# Patient Record
Sex: FEMALE | Race: WHITE | Employment: OTHER | URBAN - METROPOLITAN AREA
[De-identification: names, ages, dates, MRNs, and addresses within clinical notes are randomized per-mention and may not be internally consistent; named-entity substitution may affect disease eponyms.]

---

## 2017-01-13 RX ORDER — BUTALBITAL, ACETAMINOPHEN AND CAFFEINE 50; 325; 40 MG/1; MG/1; MG/1
TABLET ORAL
Qty: 120 TABLET | Refills: 0 | Status: SHIPPED | OUTPATIENT
Start: 2017-01-13 | End: 2017-02-10 | Stop reason: SDUPTHER

## 2017-01-17 RX ORDER — VALACYCLOVIR HYDROCHLORIDE 500 MG/1
500 TABLET, FILM COATED ORAL 2 TIMES DAILY
COMMUNITY
End: 2017-01-20 | Stop reason: SDUPTHER

## 2017-01-17 RX ORDER — CALCIUM CARBONATE 300MG(750)
TABLET,CHEWABLE ORAL
COMMUNITY
End: 2017-01-20 | Stop reason: ALTCHOICE

## 2017-01-17 RX ORDER — ATENOLOL 25 MG/1
25 TABLET ORAL DAILY
COMMUNITY
End: 2017-04-11 | Stop reason: SDUPTHER

## 2017-01-20 ENCOUNTER — OFFICE VISIT (OUTPATIENT)
Dept: FAMILY MEDICINE CLINIC | Facility: CLINIC | Age: 82
End: 2017-01-20

## 2017-01-20 VITALS
BODY MASS INDEX: 21.97 KG/M2 | DIASTOLIC BLOOD PRESSURE: 70 MMHG | HEIGHT: 59 IN | WEIGHT: 109 LBS | SYSTOLIC BLOOD PRESSURE: 122 MMHG | HEART RATE: 72 BPM

## 2017-01-20 DIAGNOSIS — D64.89 ANEMIA DUE TO OTHER CAUSE, NOT CLASSIFIED: ICD-10-CM

## 2017-01-20 DIAGNOSIS — R30.0 DYSURIA: ICD-10-CM

## 2017-01-20 DIAGNOSIS — G35 MULTIPLE SCLEROSIS (HCC): ICD-10-CM

## 2017-01-20 DIAGNOSIS — R53.82 CHRONIC FATIGUE: ICD-10-CM

## 2017-01-20 DIAGNOSIS — G43.709 CHRONIC MIGRAINE WITHOUT AURA WITHOUT STATUS MIGRAINOSUS, NOT INTRACTABLE: Primary | ICD-10-CM

## 2017-01-20 DIAGNOSIS — I10 ESSENTIAL HYPERTENSION: ICD-10-CM

## 2017-01-20 DIAGNOSIS — E78.2 MIXED HYPERLIPIDEMIA: ICD-10-CM

## 2017-01-20 DIAGNOSIS — F51.01 PRIMARY INSOMNIA: ICD-10-CM

## 2017-01-20 PROBLEM — D64.9 ABSOLUTE ANEMIA: Status: ACTIVE | Noted: 2017-01-20

## 2017-01-20 LAB
BILIRUBIN, POC: NORMAL
BLOOD URINE, POC: NORMAL
CLARITY, POC: CLEAR
COLOR, POC: YELLOW
GLUCOSE URINE, POC: NORMAL
KETONES, POC: NORMAL
LEUKOCYTE EST, POC: NORMAL
NITRITE, POC: NORMAL
PH, POC: 5.5
PROTEIN, POC: NORMAL
SPECIFIC GRAVITY, POC: 1.02
UROBILINOGEN, POC: 0.2

## 2017-01-20 PROCEDURE — 1123F ACP DISCUSS/DSCN MKR DOCD: CPT | Performed by: FAMILY MEDICINE

## 2017-01-20 PROCEDURE — 81003 URINALYSIS AUTO W/O SCOPE: CPT | Performed by: FAMILY MEDICINE

## 2017-01-20 PROCEDURE — G8484 FLU IMMUNIZE NO ADMIN: HCPCS | Performed by: FAMILY MEDICINE

## 2017-01-20 PROCEDURE — 1036F TOBACCO NON-USER: CPT | Performed by: FAMILY MEDICINE

## 2017-01-20 PROCEDURE — G8419 CALC BMI OUT NRM PARAM NOF/U: HCPCS | Performed by: FAMILY MEDICINE

## 2017-01-20 PROCEDURE — 99214 OFFICE O/P EST MOD 30 MIN: CPT | Performed by: FAMILY MEDICINE

## 2017-01-20 PROCEDURE — 4040F PNEUMOC VAC/ADMIN/RCVD: CPT | Performed by: FAMILY MEDICINE

## 2017-01-20 PROCEDURE — 1090F PRES/ABSN URINE INCON ASSESS: CPT | Performed by: FAMILY MEDICINE

## 2017-01-20 PROCEDURE — G8427 DOCREV CUR MEDS BY ELIG CLIN: HCPCS | Performed by: FAMILY MEDICINE

## 2017-01-20 PROCEDURE — G8400 PT W/DXA NO RESULTS DOC: HCPCS | Performed by: FAMILY MEDICINE

## 2017-01-20 RX ORDER — VALACYCLOVIR HYDROCHLORIDE 500 MG/1
500 TABLET, FILM COATED ORAL 2 TIMES DAILY
Qty: 30 TABLET | Refills: 1 | Status: SHIPPED | OUTPATIENT
Start: 2017-01-20 | End: 2019-01-04 | Stop reason: SDUPTHER

## 2017-01-20 RX ORDER — CLINDAMYCIN HYDROCHLORIDE 300 MG/1
CAPSULE ORAL
COMMUNITY
Start: 2017-01-19

## 2017-01-20 ASSESSMENT — ENCOUNTER SYMPTOMS
ABDOMINAL PAIN: 0
VISUAL CHANGE: 1
BLURRED VISION: 1
VOMITING: 0
COUGH: 0
SHORTNESS OF BREATH: 0
NAUSEA: 0
BLOOD IN STOOL: 0

## 2017-02-10 RX ORDER — BUTALBITAL, ACETAMINOPHEN AND CAFFEINE 50; 325; 40 MG/1; MG/1; MG/1
TABLET ORAL
Qty: 120 TABLET | Refills: 0 | Status: SHIPPED | OUTPATIENT
Start: 2017-02-10 | End: 2017-03-10 | Stop reason: SDUPTHER

## 2017-02-27 LAB
CHOLESTEROL, TOTAL: 191 MG/DL
CHOLESTEROL/HDL RATIO: 2.7
HDLC SERPL-MCNC: 70 MG/DL (ref 35–70)
LDL CHOLESTEROL CALCULATED: 90 MG/DL (ref 0–160)
TRIGL SERPL-MCNC: 157 MG/DL
TSH SERPL DL<=0.05 MIU/L-ACNC: NORMAL UIU/ML
VITAMIN B-12: NORMAL
VLDLC SERPL CALC-MCNC: 31 MG/DL

## 2017-02-28 DIAGNOSIS — D64.89 ANEMIA DUE TO OTHER CAUSE, NOT CLASSIFIED: ICD-10-CM

## 2017-02-28 DIAGNOSIS — R53.82 CHRONIC FATIGUE: ICD-10-CM

## 2017-02-28 DIAGNOSIS — E78.2 MIXED HYPERLIPIDEMIA: ICD-10-CM

## 2017-03-10 RX ORDER — BUTALBITAL, ACETAMINOPHEN AND CAFFEINE 50; 325; 40 MG/1; MG/1; MG/1
TABLET ORAL
Qty: 120 TABLET | Refills: 0 | Status: SHIPPED | OUTPATIENT
Start: 2017-03-10 | End: 2017-04-10 | Stop reason: SDUPTHER

## 2017-04-10 RX ORDER — BUTALBITAL, ACETAMINOPHEN AND CAFFEINE 50; 325; 40 MG/1; MG/1; MG/1
TABLET ORAL
Qty: 120 TABLET | Refills: 0 | Status: SHIPPED | OUTPATIENT
Start: 2017-04-10 | End: 2017-05-09 | Stop reason: SDUPTHER

## 2017-04-11 ENCOUNTER — OFFICE VISIT (OUTPATIENT)
Dept: FAMILY MEDICINE CLINIC | Age: 82
End: 2017-04-11
Payer: MEDICARE

## 2017-04-11 VITALS
BODY MASS INDEX: 22.46 KG/M2 | WEIGHT: 107 LBS | SYSTOLIC BLOOD PRESSURE: 116 MMHG | DIASTOLIC BLOOD PRESSURE: 72 MMHG | HEART RATE: 80 BPM | HEIGHT: 58 IN

## 2017-04-11 DIAGNOSIS — D50.8 OTHER IRON DEFICIENCY ANEMIA: ICD-10-CM

## 2017-04-11 DIAGNOSIS — M15.9 PRIMARY OSTEOARTHRITIS INVOLVING MULTIPLE JOINTS: ICD-10-CM

## 2017-04-11 DIAGNOSIS — M81.0 OSTEOPOROSIS: ICD-10-CM

## 2017-04-11 DIAGNOSIS — K57.32 DIVERTICULITIS OF LARGE INTESTINE WITHOUT PERFORATION OR ABSCESS WITHOUT BLEEDING: ICD-10-CM

## 2017-04-11 DIAGNOSIS — Z01.818 PRE-OPERATIVE CLEARANCE: Primary | ICD-10-CM

## 2017-04-11 DIAGNOSIS — F34.1 DYSTHYMIA: ICD-10-CM

## 2017-04-11 DIAGNOSIS — E78.2 MIXED HYPERLIPIDEMIA: ICD-10-CM

## 2017-04-11 DIAGNOSIS — F41.9 ANXIETY: ICD-10-CM

## 2017-04-11 DIAGNOSIS — I10 ESSENTIAL HYPERTENSION: ICD-10-CM

## 2017-04-11 PROCEDURE — 1036F TOBACCO NON-USER: CPT | Performed by: FAMILY MEDICINE

## 2017-04-11 PROCEDURE — 4040F PNEUMOC VAC/ADMIN/RCVD: CPT | Performed by: FAMILY MEDICINE

## 2017-04-11 PROCEDURE — 1123F ACP DISCUSS/DSCN MKR DOCD: CPT | Performed by: FAMILY MEDICINE

## 2017-04-11 PROCEDURE — G8419 CALC BMI OUT NRM PARAM NOF/U: HCPCS | Performed by: FAMILY MEDICINE

## 2017-04-11 PROCEDURE — 4005F PHARM THX FOR OP RXD: CPT | Performed by: FAMILY MEDICINE

## 2017-04-11 PROCEDURE — G8400 PT W/DXA NO RESULTS DOC: HCPCS | Performed by: FAMILY MEDICINE

## 2017-04-11 PROCEDURE — 1090F PRES/ABSN URINE INCON ASSESS: CPT | Performed by: FAMILY MEDICINE

## 2017-04-11 PROCEDURE — 99214 OFFICE O/P EST MOD 30 MIN: CPT | Performed by: FAMILY MEDICINE

## 2017-04-11 PROCEDURE — G8427 DOCREV CUR MEDS BY ELIG CLIN: HCPCS | Performed by: FAMILY MEDICINE

## 2017-04-11 RX ORDER — SIMVASTATIN 10 MG
10 TABLET ORAL NIGHTLY
Qty: 30 TABLET | Refills: 5 | Status: SHIPPED | OUTPATIENT
Start: 2017-04-11 | End: 2019-01-04 | Stop reason: SDUPTHER

## 2017-04-11 RX ORDER — ATENOLOL 25 MG/1
25 TABLET ORAL DAILY
Qty: 30 TABLET | Refills: 5 | Status: SHIPPED | OUTPATIENT
Start: 2017-04-11 | End: 2019-01-04 | Stop reason: SDUPTHER

## 2017-04-11 RX ORDER — PANTOPRAZOLE SODIUM 40 MG/1
40 TABLET, DELAYED RELEASE ORAL
COMMUNITY
Start: 2017-03-16 | End: 2019-01-04 | Stop reason: CLARIF

## 2017-04-11 ASSESSMENT — ENCOUNTER SYMPTOMS
VOMITING: 0
DIARRHEA: 0
NAUSEA: 0
COUGH: 0
HEARTBURN: 1
ABDOMINAL PAIN: 1
SHORTNESS OF BREATH: 0
BLURRED VISION: 1
BLOOD IN STOOL: 0
CONSTIPATION: 0

## 2017-05-09 RX ORDER — BUTALBITAL, ACETAMINOPHEN AND CAFFEINE 50; 325; 40 MG/1; MG/1; MG/1
TABLET ORAL
Qty: 120 TABLET | Refills: 0 | Status: SHIPPED | OUTPATIENT
Start: 2017-05-09 | End: 2017-06-06 | Stop reason: SDUPTHER

## 2017-05-22 DIAGNOSIS — D64.89 ANEMIA DUE TO OTHER CAUSE, NOT CLASSIFIED: ICD-10-CM

## 2017-05-22 DIAGNOSIS — R53.82 CHRONIC FATIGUE: ICD-10-CM

## 2017-06-06 RX ORDER — BUTALBITAL, ACETAMINOPHEN AND CAFFEINE 50; 325; 40 MG/1; MG/1; MG/1
TABLET ORAL
Qty: 120 TABLET | Refills: 0 | Status: SHIPPED | OUTPATIENT
Start: 2017-06-06 | End: 2017-07-01 | Stop reason: SDUPTHER

## 2017-07-03 RX ORDER — BUTALBITAL, ACETAMINOPHEN AND CAFFEINE 50; 325; 40 MG/1; MG/1; MG/1
TABLET ORAL
Qty: 120 TABLET | Refills: 1 | Status: SHIPPED | OUTPATIENT
Start: 2017-07-03 | End: 2017-08-31 | Stop reason: SDUPTHER

## 2017-07-14 ENCOUNTER — TELEPHONE (OUTPATIENT)
Dept: FAMILY MEDICINE CLINIC | Age: 82
End: 2017-07-14

## 2017-07-14 DIAGNOSIS — R53.82 CHRONIC FATIGUE: ICD-10-CM

## 2017-07-14 DIAGNOSIS — G43.709 CHRONIC MIGRAINE WITHOUT AURA WITHOUT STATUS MIGRAINOSUS, NOT INTRACTABLE: ICD-10-CM

## 2017-07-14 DIAGNOSIS — M15.9 PRIMARY OSTEOARTHRITIS INVOLVING MULTIPLE JOINTS: ICD-10-CM

## 2017-07-14 DIAGNOSIS — G35 MULTIPLE SCLEROSIS (HCC): Primary | ICD-10-CM

## 2017-07-21 ENCOUNTER — HOSPITAL ENCOUNTER (OUTPATIENT)
Facility: CLINIC | Age: 82
Discharge: HOME | End: 2017-07-21
Payer: MEDICARE

## 2017-07-21 ENCOUNTER — OFFICE VISIT (OUTPATIENT)
Dept: FAMILY MEDICINE CLINIC | Age: 82
End: 2017-07-21
Payer: MEDICARE

## 2017-07-21 VITALS
WEIGHT: 106 LBS | SYSTOLIC BLOOD PRESSURE: 158 MMHG | HEART RATE: 80 BPM | BODY MASS INDEX: 22.15 KG/M2 | DIASTOLIC BLOOD PRESSURE: 92 MMHG

## 2017-07-21 DIAGNOSIS — I10 ESSENTIAL HYPERTENSION: Primary | ICD-10-CM

## 2017-07-21 DIAGNOSIS — G47.09 OTHER INSOMNIA: ICD-10-CM

## 2017-07-21 DIAGNOSIS — D50.8 IRON DEFICIENCY ANEMIA SECONDARY TO INADEQUATE DIETARY IRON INTAKE: ICD-10-CM

## 2017-07-21 DIAGNOSIS — F32.89 OTHER DEPRESSION: ICD-10-CM

## 2017-07-21 LAB
ABSOLUTE EOS #: 0.1 K/UL (ref 0–0.4)
ABSOLUTE LYMPH #: 2.3 K/UL (ref 1–4.8)
ABSOLUTE MONO #: 0.4 K/UL (ref 0.1–1.2)
BASOPHILS # BLD: 1 %
BASOPHILS ABSOLUTE: 0 K/UL (ref 0–0.2)
DIFFERENTIAL TYPE: NORMAL
EOSINOPHILS RELATIVE PERCENT: 1 %
FERRITIN: 201 UG/L (ref 13–150)
HCT VFR BLD CALC: 39 % (ref 36–46)
HEMOGLOBIN: 13.1 G/DL (ref 12–16)
IRON: 119 UG/DL (ref 37–145)
LYMPHOCYTES # BLD: 36 %
MCH RBC QN AUTO: 30.5 PG (ref 26–34)
MCHC RBC AUTO-ENTMCNC: 33.6 G/DL (ref 31–37)
MCV RBC AUTO: 90.8 FL (ref 80–100)
MONOCYTES # BLD: 6 %
PDW BLD-RTO: 14.3 % (ref 12.5–15.4)
PLATELET # BLD: 289 K/UL (ref 140–450)
PLATELET ESTIMATE: NORMAL
PMV BLD AUTO: 7.5 FL (ref 6–12)
RBC # BLD: 4.3 M/UL (ref 4–5.2)
RBC # BLD: NORMAL 10*6/UL
SEG NEUTROPHILS: 56 %
SEGMENTED NEUTROPHILS ABSOLUTE COUNT: 3.5 K/UL (ref 1.8–7.7)
WBC # BLD: 6.2 K/UL (ref 3.5–11)
WBC # BLD: NORMAL 10*3/UL

## 2017-07-21 PROCEDURE — G8400 PT W/DXA NO RESULTS DOC: HCPCS | Performed by: FAMILY MEDICINE

## 2017-07-21 PROCEDURE — 85025 COMPLETE CBC W/AUTO DIFF WBC: CPT

## 2017-07-21 PROCEDURE — 4040F PNEUMOC VAC/ADMIN/RCVD: CPT | Performed by: FAMILY MEDICINE

## 2017-07-21 PROCEDURE — G8427 DOCREV CUR MEDS BY ELIG CLIN: HCPCS | Performed by: FAMILY MEDICINE

## 2017-07-21 PROCEDURE — 1123F ACP DISCUSS/DSCN MKR DOCD: CPT | Performed by: FAMILY MEDICINE

## 2017-07-21 PROCEDURE — 83540 ASSAY OF IRON: CPT

## 2017-07-21 PROCEDURE — 1090F PRES/ABSN URINE INCON ASSESS: CPT | Performed by: FAMILY MEDICINE

## 2017-07-21 PROCEDURE — G8420 CALC BMI NORM PARAMETERS: HCPCS | Performed by: FAMILY MEDICINE

## 2017-07-21 PROCEDURE — 36415 COLL VENOUS BLD VENIPUNCTURE: CPT

## 2017-07-21 PROCEDURE — 99214 OFFICE O/P EST MOD 30 MIN: CPT | Performed by: FAMILY MEDICINE

## 2017-07-21 PROCEDURE — 82728 ASSAY OF FERRITIN: CPT

## 2017-07-21 PROCEDURE — 1036F TOBACCO NON-USER: CPT | Performed by: FAMILY MEDICINE

## 2017-07-21 RX ORDER — DOXEPIN HYDROCHLORIDE 25 MG/1
75 CAPSULE ORAL NIGHTLY
Qty: 90 CAPSULE | Refills: 5 | Status: SHIPPED | OUTPATIENT
Start: 2017-07-21 | End: 2018-02-17 | Stop reason: SDUPTHER

## 2017-07-21 ASSESSMENT — ENCOUNTER SYMPTOMS
DOUBLE VISION: 0
BLOOD IN STOOL: 0
BLURRED VISION: 1
WHEEZING: 0
BACK PAIN: 0
ABDOMINAL PAIN: 0
SHORTNESS OF BREATH: 0
EYE PAIN: 0
HEARTBURN: 0
COUGH: 0

## 2017-08-31 RX ORDER — BUTALBITAL, ACETAMINOPHEN AND CAFFEINE 50; 325; 40 MG/1; MG/1; MG/1
TABLET ORAL
Qty: 120 TABLET | Refills: 0 | Status: SHIPPED | OUTPATIENT
Start: 2017-08-31 | End: 2017-09-28 | Stop reason: SDUPTHER

## 2017-09-21 ENCOUNTER — OFFICE VISIT (OUTPATIENT)
Dept: FAMILY MEDICINE CLINIC | Age: 82
End: 2017-09-21
Payer: MEDICARE

## 2017-09-21 VITALS
WEIGHT: 108 LBS | DIASTOLIC BLOOD PRESSURE: 82 MMHG | BODY MASS INDEX: 22.57 KG/M2 | SYSTOLIC BLOOD PRESSURE: 132 MMHG | HEART RATE: 66 BPM

## 2017-09-21 DIAGNOSIS — M15.9 PRIMARY OSTEOARTHRITIS INVOLVING MULTIPLE JOINTS: ICD-10-CM

## 2017-09-21 DIAGNOSIS — Z23 NEED FOR INFLUENZA VACCINATION: ICD-10-CM

## 2017-09-21 DIAGNOSIS — I10 ESSENTIAL HYPERTENSION: Primary | ICD-10-CM

## 2017-09-21 DIAGNOSIS — G43.709 CHRONIC MIGRAINE WITHOUT AURA WITHOUT STATUS MIGRAINOSUS, NOT INTRACTABLE: ICD-10-CM

## 2017-09-21 DIAGNOSIS — G35 MULTIPLE SCLEROSIS (HCC): ICD-10-CM

## 2017-09-21 PROCEDURE — G8400 PT W/DXA NO RESULTS DOC: HCPCS | Performed by: FAMILY MEDICINE

## 2017-09-21 PROCEDURE — 1123F ACP DISCUSS/DSCN MKR DOCD: CPT | Performed by: FAMILY MEDICINE

## 2017-09-21 PROCEDURE — 1036F TOBACCO NON-USER: CPT | Performed by: FAMILY MEDICINE

## 2017-09-21 PROCEDURE — 90662 IIV NO PRSV INCREASED AG IM: CPT | Performed by: FAMILY MEDICINE

## 2017-09-21 PROCEDURE — 4040F PNEUMOC VAC/ADMIN/RCVD: CPT | Performed by: FAMILY MEDICINE

## 2017-09-21 PROCEDURE — G8427 DOCREV CUR MEDS BY ELIG CLIN: HCPCS | Performed by: FAMILY MEDICINE

## 2017-09-21 PROCEDURE — G8420 CALC BMI NORM PARAMETERS: HCPCS | Performed by: FAMILY MEDICINE

## 2017-09-21 PROCEDURE — G0008 ADMIN INFLUENZA VIRUS VAC: HCPCS | Performed by: FAMILY MEDICINE

## 2017-09-21 PROCEDURE — 99213 OFFICE O/P EST LOW 20 MIN: CPT | Performed by: FAMILY MEDICINE

## 2017-09-21 PROCEDURE — 1090F PRES/ABSN URINE INCON ASSESS: CPT | Performed by: FAMILY MEDICINE

## 2017-09-21 ASSESSMENT — PATIENT HEALTH QUESTIONNAIRE - PHQ9
SUM OF ALL RESPONSES TO PHQ9 QUESTIONS 1 & 2: 0
SUM OF ALL RESPONSES TO PHQ QUESTIONS 1-9: 0
2. FEELING DOWN, DEPRESSED OR HOPELESS: 0
1. LITTLE INTEREST OR PLEASURE IN DOING THINGS: 0

## 2017-09-21 ASSESSMENT — ENCOUNTER SYMPTOMS
BLOOD IN STOOL: 0
SHORTNESS OF BREATH: 0
COUGH: 0
ABDOMINAL PAIN: 0
EYES NEGATIVE: 1

## 2017-09-28 RX ORDER — BUTALBITAL, ACETAMINOPHEN AND CAFFEINE 50; 325; 40 MG/1; MG/1; MG/1
TABLET ORAL
Qty: 120 TABLET | Refills: 0 | Status: SHIPPED | OUTPATIENT
Start: 2017-09-28 | End: 2017-10-27 | Stop reason: SDUPTHER

## 2017-10-27 RX ORDER — BUTALBITAL, ACETAMINOPHEN AND CAFFEINE 50; 325; 40 MG/1; MG/1; MG/1
TABLET ORAL
Qty: 120 TABLET | Refills: 0 | Status: SHIPPED | OUTPATIENT
Start: 2017-10-27 | End: 2017-11-24 | Stop reason: SDUPTHER

## 2017-11-24 RX ORDER — BUTALBITAL, ACETAMINOPHEN AND CAFFEINE 50; 325; 40 MG/1; MG/1; MG/1
TABLET ORAL
Qty: 120 TABLET | Refills: 0 | Status: SHIPPED | OUTPATIENT
Start: 2017-11-24 | End: 2017-12-22 | Stop reason: SDUPTHER

## 2017-11-24 NOTE — TELEPHONE ENCOUNTER
Pharm requested refill last seen [phone]    Health Maintenance   Topic Date Due    DTaP/Tdap/Td vaccine (2 - Td) 03/21/2026    Zostavax vaccine  Completed    DEXA (modify frequency per FRAX score)  Completed    Flu vaccine  Completed    Pneumococcal low/med risk  Completed             (applicable per patient's age: Cancer Screenings, Depression Screening, Fall Risk Screening, Immunizations)    LDL Calculated (mg/dL)   Date Value   02/27/2017 90      (goal A1C is < 7)   (goal LDL is <100) need 30-50% reduction from baseline     BP Readings from Last 3 Encounters:   09/21/17 132/82   07/21/17 (!) 158/92   04/11/17 116/72    (goal /80)      All Future Testing planned in [facility]:      Next Visit Date:  Future Appointments  Date Time Provider Shabnam Steward   1/22/2018 1:30 PM MD ana rosa Murray fp [facility]            Patient Active Problem List:     Absolute anemia     Chronic fatigue     Essential hypertension     Mixed hyperlipidemia     Primary insomnia     Multiple sclerosis (HCC)     Chronic migraine without aura without status migrainosus, not intractable     Osteoarthritis     Hyperlipidemia     Hypertension     Osteoporosis     Diverticulitis     Anemia     Depression     Anxiety

## 2017-12-22 RX ORDER — BUTALBITAL, ACETAMINOPHEN AND CAFFEINE 50; 325; 40 MG/1; MG/1; MG/1
TABLET ORAL
Qty: 120 TABLET | Refills: 0 | Status: SHIPPED | OUTPATIENT
Start: 2017-12-22 | End: 2018-01-19 | Stop reason: SDUPTHER

## 2017-12-22 NOTE — TELEPHONE ENCOUNTER
09/21/2017 last in office.   Health Maintenance   Topic Date Due    DTaP/Tdap/Td vaccine (2 - Td) 03/21/2026    Zostavax vaccine  Completed    DEXA (modify frequency per FRAX score)  Completed    Flu vaccine  Completed    Pneumococcal low/med risk  Completed             (applicable per patient's age: Cancer Screenings, Depression Screening, Fall Risk Screening, Immunizations)    LDL Calculated (mg/dL)   Date Value   02/27/2017 90      (goal A1C is < 7)   (goal LDL is <100) need 30-50% reduction from baseline     BP Readings from Last 3 Encounters:   09/21/17 132/82   07/21/17 (!) 158/92   04/11/17 116/72    (goal /80)      All Future Testing planned in [facility]:      Next Visit Date:  Future Appointments  Date Time Provider Shabnam tSeward   1/22/2018 1:30 PM MD ana rosa Haji fp [facility]            Patient Active Problem List:     Absolute anemia     Chronic fatigue     Essential hypertension     Mixed hyperlipidemia     Primary insomnia     Multiple sclerosis (HCC)     Chronic migraine without aura without status migrainosus, not intractable     Osteoarthritis     Hyperlipidemia     Hypertension     Osteoporosis     Diverticulitis     Anemia     Depression     Anxiety

## 2018-01-19 RX ORDER — BUTALBITAL, ACETAMINOPHEN AND CAFFEINE 50; 325; 40 MG/1; MG/1; MG/1
TABLET ORAL
Qty: 120 TABLET | Refills: 0 | Status: SHIPPED | OUTPATIENT
Start: 2018-01-19 | End: 2018-02-16 | Stop reason: SDUPTHER

## 2018-01-22 ENCOUNTER — OFFICE VISIT (OUTPATIENT)
Dept: FAMILY MEDICINE CLINIC | Age: 83
End: 2018-01-22
Payer: MEDICARE

## 2018-01-22 VITALS
WEIGHT: 105 LBS | DIASTOLIC BLOOD PRESSURE: 84 MMHG | BODY MASS INDEX: 21.95 KG/M2 | SYSTOLIC BLOOD PRESSURE: 146 MMHG | HEART RATE: 86 BPM

## 2018-01-22 DIAGNOSIS — F34.1 DYSTHYMIA: ICD-10-CM

## 2018-01-22 DIAGNOSIS — G43.709 CHRONIC MIGRAINE WITHOUT AURA WITHOUT STATUS MIGRAINOSUS, NOT INTRACTABLE: ICD-10-CM

## 2018-01-22 DIAGNOSIS — M15.9 PRIMARY OSTEOARTHRITIS INVOLVING MULTIPLE JOINTS: ICD-10-CM

## 2018-01-22 DIAGNOSIS — I10 ESSENTIAL HYPERTENSION: ICD-10-CM

## 2018-01-22 DIAGNOSIS — R93.6 ABNORMAL FINDINGS ON DIAGNOSTIC IMAGING OF LIMBS: ICD-10-CM

## 2018-01-22 DIAGNOSIS — G35 MULTIPLE SCLEROSIS (HCC): Primary | ICD-10-CM

## 2018-01-22 DIAGNOSIS — R26.89 BALANCE DISORDER: ICD-10-CM

## 2018-01-22 DIAGNOSIS — M80.00XS AGE-RELATED OSTEOPOROSIS WITH CURRENT PATHOLOGICAL FRACTURE, SEQUELA: ICD-10-CM

## 2018-01-22 DIAGNOSIS — R41.3 MEMORY LOSS: ICD-10-CM

## 2018-01-22 DIAGNOSIS — F33.42 RECURRENT MAJOR DEPRESSIVE EPISODES, IN FULL REMISSION (HCC): ICD-10-CM

## 2018-01-22 DIAGNOSIS — E87.1 LOW SODIUM LEVELS: ICD-10-CM

## 2018-01-22 PROBLEM — D64.9 ABSOLUTE ANEMIA: Status: RESOLVED | Noted: 2017-01-20 | Resolved: 2018-01-22

## 2018-01-22 PROCEDURE — 99214 OFFICE O/P EST MOD 30 MIN: CPT | Performed by: FAMILY MEDICINE

## 2018-01-22 PROCEDURE — 1036F TOBACCO NON-USER: CPT | Performed by: FAMILY MEDICINE

## 2018-01-22 PROCEDURE — G8400 PT W/DXA NO RESULTS DOC: HCPCS | Performed by: FAMILY MEDICINE

## 2018-01-22 PROCEDURE — 1123F ACP DISCUSS/DSCN MKR DOCD: CPT | Performed by: FAMILY MEDICINE

## 2018-01-22 PROCEDURE — G8427 DOCREV CUR MEDS BY ELIG CLIN: HCPCS | Performed by: FAMILY MEDICINE

## 2018-01-22 PROCEDURE — G8420 CALC BMI NORM PARAMETERS: HCPCS | Performed by: FAMILY MEDICINE

## 2018-01-22 PROCEDURE — 4040F PNEUMOC VAC/ADMIN/RCVD: CPT | Performed by: FAMILY MEDICINE

## 2018-01-22 PROCEDURE — 1090F PRES/ABSN URINE INCON ASSESS: CPT | Performed by: FAMILY MEDICINE

## 2018-01-22 PROCEDURE — G8484 FLU IMMUNIZE NO ADMIN: HCPCS | Performed by: FAMILY MEDICINE

## 2018-01-22 RX ORDER — CALCITONIN SALMON 200 [IU]/.09ML
1 SPRAY, METERED NASAL
COMMUNITY
Start: 2017-12-06 | End: 2019-01-04 | Stop reason: CLARIF

## 2018-01-22 ASSESSMENT — ENCOUNTER SYMPTOMS
BLURRED VISION: 1
COUGH: 0
ABDOMINAL PAIN: 0
CONSTIPATION: 1
SHORTNESS OF BREATH: 0
HEARTBURN: 1

## 2018-01-22 NOTE — PROGRESS NOTES
Psychiatric/Behavioral: Positive for depression (stable on meds). The patient has insomnia (sleeping well with med). The patient is not nervous/anxious.         PAST MEDICAL HISTORY    Past Medical History:   Diagnosis Date    Abdominal pain     LLQ    Allergy     Anemia     Iron deficiency    Anxiety     Bronchitis     Depression     Diverticulitis     Dizziness     Fracture     Left pubic    Gait disturbance     Genital herpes     GERD (gastroesophageal reflux disease)     Headache     History of ear infections     Hyperlipidemia     Hypertension     Multiple sclerosis (HCC)     Osteoarthritis     Osteoporosis     Parkinsons (HCC)     Pneumonia     Swallowing impairment     Vision disturbance        FAMILY HISTORY    Family History   Problem Relation Age of Onset    Cancer Mother     Glaucoma Mother     Other Mother 50     peritonitis    Diabetes Father     High Blood Pressure Father     Heart Disease Father     Ulcerative Colitis Father     Stroke Father        SOCIAL HISTORY    Social History     Social History    Marital status:      Spouse name: N/A    Number of children: N/A    Years of education: N/A     Social History Main Topics    Smoking status: Former Smoker    Smokeless tobacco: Never Used      Comment: 40-50    Alcohol use Yes      Comment: Socially    Drug use: No    Sexual activity: Not Asked     Other Topics Concern    None     Social History Narrative    None       SURGICAL HISTORY    Past Surgical History:   Procedure Laterality Date    APPENDECTOMY      COLONOSCOPY      EYE SURGERY      cataract    HERNIA REPAIR      HYSTERECTOMY      JOINT REPLACEMENT  2007    left shoulder    REVISION SHOULDER ARTHROPLASTY  2017    reverse shoulder, Dr. Maria E Jo    Current Outpatient Prescriptions   Medication Sig Dispense Refill    this encounter       All patient questions answered. Patient voiced understanding. Quality Measures    Body mass index is 21.95 kg/m². Normal. Weight control planned discussed Healthy diet and regular exercise. BP: (!) 146/84. Blood pressure is high. Treatment plan consists of No treatment change needed. Fall Risk 1/20/2017   2 or more falls in past year? no   Fall with injury in past year? no     The patient does not have a history of falls. I did , complete a risk assessment for falls. A plan of care for falls referral to physical therapy provided for strength and balance training-pt declined    Lab Results   Component Value Date    LDLCALC 90 02/27/2017    (goal LDL reduction with dx if diabetes is 50% LDL reduction)    PHQ Scores 9/21/2017   PHQ2 Score 0   PHQ9 Score 0     Interpretation of Total Score Depression Severity: 1-4 = Minimal depression, 5-9 = Mild depression, 10-14 = Moderate depression, 15-19 = Moderately severe depression, 20-27 = Severe depression       Return in about 2 months (around 3/22/2018).         Electronically signed by Qamar Billingsley MD on 1/22/18 at 1:45 PM

## 2018-01-31 ENCOUNTER — HOSPITAL ENCOUNTER (OUTPATIENT)
Age: 83
Discharge: HOME | End: 2018-01-31
Payer: MEDICARE

## 2018-01-31 DIAGNOSIS — M80.00XS AGE-RELATED OSTEOPOROSIS WITH CURRENT PATHOLOGICAL FRACTURE, SEQUELA: ICD-10-CM

## 2018-01-31 DIAGNOSIS — I10 ESSENTIAL HYPERTENSION: ICD-10-CM

## 2018-01-31 DIAGNOSIS — E87.1 LOW SODIUM LEVELS: ICD-10-CM

## 2018-01-31 LAB
ALBUMIN SERPL-MCNC: 4.5 G/DL (ref 3.5–5.2)
ALBUMIN/GLOBULIN RATIO: ABNORMAL (ref 1–2.5)
ALP BLD-CCNC: 126 U/L (ref 35–104)
ALT SERPL-CCNC: 13 U/L (ref 5–33)
ANION GAP SERPL CALCULATED.3IONS-SCNC: 12 MMOL/L (ref 9–17)
AST SERPL-CCNC: 21 U/L
BILIRUB SERPL-MCNC: 0.2 MG/DL (ref 0.3–1.2)
BUN BLDV-MCNC: 17 MG/DL (ref 8–23)
BUN/CREAT BLD: 17 (ref 9–20)
CALCIUM SERPL-MCNC: 9.6 MG/DL (ref 8.6–10.4)
CHLORIDE BLD-SCNC: 98 MMOL/L (ref 98–107)
CO2: 27 MMOL/L (ref 20–31)
CREAT SERPL-MCNC: 1 MG/DL (ref 0.5–0.9)
GFR AFRICAN AMERICAN: >60 ML/MIN
GFR NON-AFRICAN AMERICAN: 53 ML/MIN
GFR SERPL CREATININE-BSD FRML MDRD: ABNORMAL ML/MIN/{1.73_M2}
GFR SERPL CREATININE-BSD FRML MDRD: ABNORMAL ML/MIN/{1.73_M2}
GLUCOSE BLD-MCNC: 97 MG/DL (ref 70–99)
POTASSIUM SERPL-SCNC: 3.9 MMOL/L (ref 3.7–5.3)
SODIUM BLD-SCNC: 137 MMOL/L (ref 135–144)
TOTAL PROTEIN: 6.9 G/DL (ref 6.4–8.3)

## 2018-01-31 PROCEDURE — 82652 VIT D 1 25-DIHYDROXY: CPT

## 2018-01-31 PROCEDURE — 36415 COLL VENOUS BLD VENIPUNCTURE: CPT

## 2018-01-31 PROCEDURE — 80053 COMPREHEN METABOLIC PANEL: CPT

## 2018-02-03 LAB — VITAMIN D 1,25-DIHYDROXY: 49.7 PG/ML (ref 19.9–79.3)

## 2018-02-12 ENCOUNTER — HOSPITAL ENCOUNTER (OUTPATIENT)
Dept: MAMMOGRAPHY | Age: 83
Discharge: HOME | End: 2018-02-14
Payer: MEDICARE

## 2018-02-12 DIAGNOSIS — M80.00XS AGE-RELATED OSTEOPOROSIS WITH CURRENT PATHOLOGICAL FRACTURE, SEQUELA: ICD-10-CM

## 2018-02-12 DIAGNOSIS — R93.6 ABNORMAL FINDINGS ON DIAGNOSTIC IMAGING OF LIMBS: ICD-10-CM

## 2018-02-12 PROCEDURE — 77080 DXA BONE DENSITY AXIAL: CPT

## 2018-02-18 RX ORDER — BUTALBITAL, ACETAMINOPHEN AND CAFFEINE 50; 325; 40 MG/1; MG/1; MG/1
TABLET ORAL
Qty: 120 TABLET | Refills: 0 | Status: SHIPPED | OUTPATIENT
Start: 2018-02-18 | End: 2018-03-19 | Stop reason: SDUPTHER

## 2018-02-18 RX ORDER — DOXEPIN HYDROCHLORIDE 25 MG/1
CAPSULE ORAL
Qty: 90 CAPSULE | Refills: 4 | Status: SHIPPED | OUTPATIENT
Start: 2018-02-18 | End: 2018-07-28 | Stop reason: SDUPTHER

## 2018-03-19 RX ORDER — BUTALBITAL, ACETAMINOPHEN AND CAFFEINE 50; 325; 40 MG/1; MG/1; MG/1
TABLET ORAL
Qty: 120 TABLET | Refills: 0 | Status: SHIPPED | OUTPATIENT
Start: 2018-03-19 | End: 2018-05-18 | Stop reason: SDUPTHER

## 2018-04-20 RX ORDER — BUTALBITAL, ACETAMINOPHEN AND CAFFEINE 50; 325; 40 MG/1; MG/1; MG/1
TABLET ORAL
Qty: 120 TABLET | Refills: 0 | Status: SHIPPED | OUTPATIENT
Start: 2018-04-20 | End: 2018-05-18 | Stop reason: SDUPTHER

## 2018-05-18 RX ORDER — BUTALBITAL, ACETAMINOPHEN AND CAFFEINE 50; 325; 40 MG/1; MG/1; MG/1
TABLET ORAL
Qty: 120 TABLET | Refills: 0 | Status: SHIPPED | OUTPATIENT
Start: 2018-05-18 | End: 2018-06-15 | Stop reason: SDUPTHER

## 2018-06-15 RX ORDER — BUTALBITAL, ACETAMINOPHEN AND CAFFEINE 50; 325; 40 MG/1; MG/1; MG/1
TABLET ORAL
Qty: 120 TABLET | Refills: 0 | Status: SHIPPED | OUTPATIENT
Start: 2018-06-15 | End: 2018-07-13 | Stop reason: SDUPTHER

## 2018-07-13 RX ORDER — BUTALBITAL, ACETAMINOPHEN AND CAFFEINE 50; 325; 40 MG/1; MG/1; MG/1
TABLET ORAL
Qty: 120 TABLET | Refills: 0 | Status: SHIPPED | OUTPATIENT
Start: 2018-07-13 | End: 2018-08-10 | Stop reason: SDUPTHER

## 2018-07-13 NOTE — TELEPHONE ENCOUNTER
Pharm requested refill last seen [phone]    Health Maintenance   Topic Date Due    Shingles Vaccine (1 of 2 - 2 Dose Series) 03/20/1987    Flu vaccine (1) 09/01/2018    Potassium monitoring  01/31/2019    Creatinine monitoring  01/31/2019    DTaP/Tdap/Td vaccine (2 - Td) 03/21/2026    DEXA (modify frequency per FRAX score)  Completed    Pneumococcal low/med risk  Completed             (applicable per patient's age: Cancer Screenings, Depression Screening, Fall Risk Screening, Immunizations)    LDL Calculated (mg/dL)   Date Value   02/27/2017 90     AST (U/L)   Date Value   01/31/2018 21     ALT (U/L)   Date Value   01/31/2018 13     BUN (mg/dL)   Date Value   01/31/2018 17      (goal A1C is < 7)   (goal LDL is <100) need 30-50% reduction from baseline     BP Readings from Last 3 Encounters:   01/22/18 (!) 146/84   09/21/17 132/82   07/21/17 (!) 158/92    (goal /80)      All Future Testing planned in [facility]:      Next Visit Date:  No future appointments.          Patient Active Problem List:     Chronic fatigue     Essential hypertension     Mixed hyperlipidemia     Primary insomnia     Multiple sclerosis (HCC)     Chronic migraine without aura without status migrainosus, not intractable     Osteoarthritis     Osteoporosis     Diverticulitis     Anemia     Depression     Anxiety

## 2018-07-29 RX ORDER — DOXEPIN HYDROCHLORIDE 25 MG/1
CAPSULE ORAL
Qty: 90 CAPSULE | Refills: 3 | Status: SHIPPED | OUTPATIENT
Start: 2018-07-29 | End: 2019-01-04 | Stop reason: SDUPTHER

## 2018-08-10 RX ORDER — BUTALBITAL, ACETAMINOPHEN AND CAFFEINE 50; 325; 40 MG/1; MG/1; MG/1
TABLET ORAL
Qty: 120 TABLET | Refills: 0 | Status: SHIPPED | OUTPATIENT
Start: 2018-08-10 | End: 2018-09-07 | Stop reason: SDUPTHER

## 2018-09-07 RX ORDER — BUTALBITAL, ACETAMINOPHEN AND CAFFEINE 50; 325; 40 MG/1; MG/1; MG/1
TABLET ORAL
Qty: 120 TABLET | Refills: 0 | Status: SHIPPED | OUTPATIENT
Start: 2018-09-07 | End: 2018-10-05 | Stop reason: SDUPTHER

## 2018-11-06 RX ORDER — BUTALBITAL, ACETAMINOPHEN AND CAFFEINE 50; 325; 40 MG/1; MG/1; MG/1
TABLET ORAL
Qty: 120 TABLET | Refills: 0 | Status: SHIPPED | OUTPATIENT
Start: 2018-11-06 | End: 2018-12-04 | Stop reason: SDUPTHER

## 2018-11-20 ENCOUNTER — TELEPHONE (OUTPATIENT)
Dept: FAMILY MEDICINE CLINIC | Age: 83
End: 2018-11-20

## 2018-11-21 NOTE — TELEPHONE ENCOUNTER
Patient informed and states that she will rest and drink lots of fluids and give us a call on Friday if she is still not feeling good

## 2018-12-04 RX ORDER — BUTALBITAL, ACETAMINOPHEN AND CAFFEINE 50; 325; 40 MG/1; MG/1; MG/1
TABLET ORAL
Qty: 120 TABLET | Refills: 0 | Status: SHIPPED | OUTPATIENT
Start: 2018-12-04 | End: 2019-01-04 | Stop reason: SDUPTHER

## 2019-01-04 ENCOUNTER — TELEPHONE (OUTPATIENT)
Dept: FAMILY MEDICINE CLINIC | Age: 84
End: 2019-01-04

## 2019-01-04 ENCOUNTER — OFFICE VISIT (OUTPATIENT)
Dept: FAMILY MEDICINE CLINIC | Age: 84
End: 2019-01-04
Payer: MEDICARE

## 2019-01-04 VITALS
DIASTOLIC BLOOD PRESSURE: 92 MMHG | SYSTOLIC BLOOD PRESSURE: 138 MMHG | WEIGHT: 95 LBS | BODY MASS INDEX: 19.15 KG/M2 | HEART RATE: 88 BPM | HEIGHT: 59 IN

## 2019-01-04 DIAGNOSIS — B00.9 HERPES: ICD-10-CM

## 2019-01-04 DIAGNOSIS — M81.0 AGE-RELATED OSTEOPOROSIS WITHOUT CURRENT PATHOLOGICAL FRACTURE: ICD-10-CM

## 2019-01-04 DIAGNOSIS — R53.82 CHRONIC FATIGUE: ICD-10-CM

## 2019-01-04 DIAGNOSIS — G35 MULTIPLE SCLEROSIS (HCC): ICD-10-CM

## 2019-01-04 DIAGNOSIS — E78.2 MIXED HYPERLIPIDEMIA: ICD-10-CM

## 2019-01-04 DIAGNOSIS — E55.9 VITAMIN D DEFICIENCY: ICD-10-CM

## 2019-01-04 DIAGNOSIS — M15.9 PRIMARY OSTEOARTHRITIS INVOLVING MULTIPLE JOINTS: ICD-10-CM

## 2019-01-04 DIAGNOSIS — R63.4 WEIGHT LOSS, UNINTENTIONAL: ICD-10-CM

## 2019-01-04 DIAGNOSIS — H53.2 DOUBLE VISION: ICD-10-CM

## 2019-01-04 DIAGNOSIS — I10 ESSENTIAL HYPERTENSION: Primary | ICD-10-CM

## 2019-01-04 DIAGNOSIS — G43.709 CHRONIC MIGRAINE WITHOUT AURA WITHOUT STATUS MIGRAINOSUS, NOT INTRACTABLE: ICD-10-CM

## 2019-01-04 DIAGNOSIS — F51.01 PRIMARY INSOMNIA: ICD-10-CM

## 2019-01-04 DIAGNOSIS — F33.42 MAJOR DEPRESSIVE DISORDER, RECURRENT, IN FULL REMISSION (HCC): ICD-10-CM

## 2019-01-04 DIAGNOSIS — M80.00XS AGE-RELATED OSTEOPOROSIS WITH CURRENT PATHOLOGICAL FRACTURE, SEQUELA: ICD-10-CM

## 2019-01-04 PROCEDURE — 99214 OFFICE O/P EST MOD 30 MIN: CPT | Performed by: FAMILY MEDICINE

## 2019-01-04 PROCEDURE — G8399 PT W/DXA RESULTS DOCUMENT: HCPCS | Performed by: FAMILY MEDICINE

## 2019-01-04 PROCEDURE — G8427 DOCREV CUR MEDS BY ELIG CLIN: HCPCS | Performed by: FAMILY MEDICINE

## 2019-01-04 PROCEDURE — G8482 FLU IMMUNIZE ORDER/ADMIN: HCPCS | Performed by: FAMILY MEDICINE

## 2019-01-04 PROCEDURE — G8420 CALC BMI NORM PARAMETERS: HCPCS | Performed by: FAMILY MEDICINE

## 2019-01-04 PROCEDURE — 1123F ACP DISCUSS/DSCN MKR DOCD: CPT | Performed by: FAMILY MEDICINE

## 2019-01-04 PROCEDURE — 1090F PRES/ABSN URINE INCON ASSESS: CPT | Performed by: FAMILY MEDICINE

## 2019-01-04 PROCEDURE — 1101F PT FALLS ASSESS-DOCD LE1/YR: CPT | Performed by: FAMILY MEDICINE

## 2019-01-04 PROCEDURE — 1036F TOBACCO NON-USER: CPT | Performed by: FAMILY MEDICINE

## 2019-01-04 PROCEDURE — 4040F PNEUMOC VAC/ADMIN/RCVD: CPT | Performed by: FAMILY MEDICINE

## 2019-01-04 RX ORDER — SIMVASTATIN 10 MG
10 TABLET ORAL NIGHTLY
Qty: 30 TABLET | Refills: 5 | Status: SHIPPED | OUTPATIENT
Start: 2019-01-04 | End: 2019-09-23 | Stop reason: SDUPTHER

## 2019-01-04 RX ORDER — ATENOLOL 25 MG/1
25 TABLET ORAL DAILY
Qty: 30 TABLET | Refills: 5 | Status: SHIPPED | OUTPATIENT
Start: 2019-01-04 | End: 2019-09-23 | Stop reason: SDUPTHER

## 2019-01-04 RX ORDER — VALACYCLOVIR HYDROCHLORIDE 500 MG/1
500 TABLET, FILM COATED ORAL 2 TIMES DAILY
Qty: 30 TABLET | Refills: 1 | Status: SHIPPED | OUTPATIENT
Start: 2019-01-04 | End: 2021-05-04 | Stop reason: SDUPTHER

## 2019-01-04 RX ORDER — DOXEPIN HYDROCHLORIDE 25 MG/1
50 CAPSULE ORAL 2 TIMES DAILY
Qty: 120 CAPSULE | Refills: 3 | Status: SHIPPED | OUTPATIENT
Start: 2019-01-04 | End: 2021-10-01 | Stop reason: SDUPTHER

## 2019-01-04 RX ORDER — IBANDRONATE SODIUM 150 MG/1
150 TABLET, FILM COATED ORAL
Qty: 3 TABLET | Refills: 3 | Status: SHIPPED | OUTPATIENT
Start: 2019-01-04 | End: 2021-05-04 | Stop reason: SDUPTHER

## 2019-01-04 RX ORDER — BUTALBITAL, ACETAMINOPHEN AND CAFFEINE 50; 325; 40 MG/1; MG/1; MG/1
TABLET ORAL
Qty: 120 TABLET | Refills: 0 | Status: SHIPPED | OUTPATIENT
Start: 2019-01-04 | End: 2019-02-01 | Stop reason: SDUPTHER

## 2019-01-04 ASSESSMENT — ENCOUNTER SYMPTOMS
COUGH: 0
CONSTIPATION: 0
DIARRHEA: 0
SHORTNESS OF BREATH: 0
ABDOMINAL PAIN: 0
VISUAL CHANGE: 1

## 2019-01-07 LAB
ALBUMIN SERPL-MCNC: NORMAL G/DL
ALP BLD-CCNC: NORMAL U/L
ALT SERPL-CCNC: NORMAL U/L
ANION GAP SERPL CALCULATED.3IONS-SCNC: NORMAL MMOL/L
AST SERPL-CCNC: NORMAL U/L
BASOPHILS ABSOLUTE: NORMAL /ΜL
BASOPHILS RELATIVE PERCENT: NORMAL %
BILIRUB SERPL-MCNC: NORMAL MG/DL (ref 0.1–1.4)
BUN BLDV-MCNC: NORMAL MG/DL
CALCIUM SERPL-MCNC: NORMAL MG/DL
CHLORIDE BLD-SCNC: NORMAL MMOL/L
CHOLESTEROL, TOTAL: NORMAL MG/DL
CHOLESTEROL/HDL RATIO: NORMAL
CO2: NORMAL MMOL/L
CREAT SERPL-MCNC: NORMAL MG/DL
EOSINOPHILS ABSOLUTE: NORMAL /ΜL
EOSINOPHILS RELATIVE PERCENT: NORMAL %
GFR CALCULATED: NORMAL
GLUCOSE BLD-MCNC: NORMAL MG/DL
HCT VFR BLD CALC: NORMAL % (ref 36–46)
HDLC SERPL-MCNC: NORMAL MG/DL (ref 35–70)
HEMOGLOBIN: NORMAL G/DL (ref 12–16)
LDL CHOLESTEROL CALCULATED: NORMAL MG/DL (ref 0–160)
LYMPHOCYTES ABSOLUTE: NORMAL /ΜL
LYMPHOCYTES RELATIVE PERCENT: NORMAL %
MCH RBC QN AUTO: NORMAL PG
MCHC RBC AUTO-ENTMCNC: NORMAL G/DL
MCV RBC AUTO: NORMAL FL
MONOCYTES ABSOLUTE: NORMAL /ΜL
MONOCYTES RELATIVE PERCENT: NORMAL %
NEUTROPHILS ABSOLUTE: NORMAL /ΜL
NEUTROPHILS RELATIVE PERCENT: NORMAL %
PDW BLD-RTO: NORMAL %
PLATELET # BLD: NORMAL K/ΜL
PMV BLD AUTO: NORMAL FL
POTASSIUM SERPL-SCNC: NORMAL MMOL/L
RBC # BLD: NORMAL 10^6/ΜL
SODIUM BLD-SCNC: NORMAL MMOL/L
TOTAL PROTEIN: NORMAL
TRIGL SERPL-MCNC: NORMAL MG/DL
TSH SERPL DL<=0.05 MIU/L-ACNC: NORMAL UIU/ML
VLDLC SERPL CALC-MCNC: NORMAL MG/DL
WBC # BLD: NORMAL 10^3/ML

## 2019-01-08 DIAGNOSIS — R53.82 CHRONIC FATIGUE: ICD-10-CM

## 2019-01-08 DIAGNOSIS — I10 ESSENTIAL HYPERTENSION: ICD-10-CM

## 2019-01-08 DIAGNOSIS — E78.2 MIXED HYPERLIPIDEMIA: ICD-10-CM

## 2019-01-08 DIAGNOSIS — R63.4 WEIGHT LOSS, UNINTENTIONAL: ICD-10-CM

## 2019-02-01 DIAGNOSIS — G43.709 CHRONIC MIGRAINE WITHOUT AURA WITHOUT STATUS MIGRAINOSUS, NOT INTRACTABLE: ICD-10-CM

## 2019-02-01 RX ORDER — BUTALBITAL, ACETAMINOPHEN AND CAFFEINE 50; 325; 40 MG/1; MG/1; MG/1
TABLET ORAL
Qty: 120 TABLET | Refills: 0 | Status: SHIPPED | OUTPATIENT
Start: 2019-02-01 | End: 2019-03-08 | Stop reason: SDUPTHER

## 2019-02-07 ENCOUNTER — TELEPHONE (OUTPATIENT)
Dept: FAMILY MEDICINE CLINIC | Age: 84
End: 2019-02-07

## 2019-02-11 ENCOUNTER — TELEPHONE (OUTPATIENT)
Dept: FAMILY MEDICINE CLINIC | Age: 84
End: 2019-02-11

## 2019-02-25 ENCOUNTER — TELEPHONE (OUTPATIENT)
Dept: FAMILY MEDICINE CLINIC | Age: 84
End: 2019-02-25

## 2019-03-01 ENCOUNTER — TELEPHONE (OUTPATIENT)
Dept: FAMILY MEDICINE CLINIC | Age: 84
End: 2019-03-01

## 2019-03-08 DIAGNOSIS — G43.709 CHRONIC MIGRAINE WITHOUT AURA WITHOUT STATUS MIGRAINOSUS, NOT INTRACTABLE: ICD-10-CM

## 2019-03-08 RX ORDER — BUTALBITAL, ACETAMINOPHEN AND CAFFEINE 50; 325; 40 MG/1; MG/1; MG/1
TABLET ORAL
Qty: 120 TABLET | Refills: 0 | Status: SHIPPED | OUTPATIENT
Start: 2019-03-08 | End: 2019-05-21

## 2019-03-11 ENCOUNTER — OFFICE VISIT (OUTPATIENT)
Dept: FAMILY MEDICINE CLINIC | Age: 84
End: 2019-03-11
Payer: MEDICARE

## 2019-03-11 VITALS
WEIGHT: 98 LBS | BODY MASS INDEX: 19.79 KG/M2 | HEART RATE: 88 BPM | SYSTOLIC BLOOD PRESSURE: 160 MMHG | DIASTOLIC BLOOD PRESSURE: 80 MMHG

## 2019-03-11 DIAGNOSIS — F33.42 MAJOR DEPRESSIVE DISORDER, RECURRENT, IN FULL REMISSION (HCC): ICD-10-CM

## 2019-03-11 DIAGNOSIS — S06.5X0D SUBDURAL HEMATOMA WITHOUT COMA, WITHOUT LOSS OF CONSCIOUSNESS, SUBSEQUENT ENCOUNTER: Primary | ICD-10-CM

## 2019-03-11 DIAGNOSIS — G35 MULTIPLE SCLEROSIS (HCC): ICD-10-CM

## 2019-03-11 DIAGNOSIS — G43.709 CHRONIC MIGRAINE WITHOUT AURA WITHOUT STATUS MIGRAINOSUS, NOT INTRACTABLE: ICD-10-CM

## 2019-03-11 PROCEDURE — 1036F TOBACCO NON-USER: CPT | Performed by: FAMILY MEDICINE

## 2019-03-11 PROCEDURE — 99214 OFFICE O/P EST MOD 30 MIN: CPT | Performed by: FAMILY MEDICINE

## 2019-03-11 PROCEDURE — G8482 FLU IMMUNIZE ORDER/ADMIN: HCPCS | Performed by: FAMILY MEDICINE

## 2019-03-11 PROCEDURE — G8399 PT W/DXA RESULTS DOCUMENT: HCPCS | Performed by: FAMILY MEDICINE

## 2019-03-11 PROCEDURE — 1123F ACP DISCUSS/DSCN MKR DOCD: CPT | Performed by: FAMILY MEDICINE

## 2019-03-11 PROCEDURE — 1101F PT FALLS ASSESS-DOCD LE1/YR: CPT | Performed by: FAMILY MEDICINE

## 2019-03-11 PROCEDURE — 1090F PRES/ABSN URINE INCON ASSESS: CPT | Performed by: FAMILY MEDICINE

## 2019-03-11 PROCEDURE — G8428 CUR MEDS NOT DOCUMENT: HCPCS | Performed by: FAMILY MEDICINE

## 2019-03-11 PROCEDURE — G8420 CALC BMI NORM PARAMETERS: HCPCS | Performed by: FAMILY MEDICINE

## 2019-03-11 PROCEDURE — 4040F PNEUMOC VAC/ADMIN/RCVD: CPT | Performed by: FAMILY MEDICINE

## 2019-03-11 ASSESSMENT — ENCOUNTER SYMPTOMS
VOMITING: 0
COUGH: 0
EYES NEGATIVE: 1
TROUBLE SWALLOWING: 1
SHORTNESS OF BREATH: 0
BACK PAIN: 1
NAUSEA: 0

## 2019-04-05 DIAGNOSIS — G43.709 CHRONIC MIGRAINE WITHOUT AURA WITHOUT STATUS MIGRAINOSUS, NOT INTRACTABLE: ICD-10-CM

## 2019-04-05 RX ORDER — BUTALBITAL, ACETAMINOPHEN AND CAFFEINE 50; 325; 40 MG/1; MG/1; MG/1
TABLET ORAL
Qty: 120 TABLET | Refills: 0 | Status: SHIPPED | OUTPATIENT
Start: 2019-04-05 | End: 2019-05-06 | Stop reason: SDUPTHER

## 2019-05-06 DIAGNOSIS — G43.709 CHRONIC MIGRAINE WITHOUT AURA WITHOUT STATUS MIGRAINOSUS, NOT INTRACTABLE: ICD-10-CM

## 2019-05-06 RX ORDER — BUTALBITAL, ACETAMINOPHEN AND CAFFEINE 50; 325; 40 MG/1; MG/1; MG/1
TABLET ORAL
Qty: 120 TABLET | Refills: 0 | Status: SHIPPED | OUTPATIENT
Start: 2019-05-06 | End: 2019-05-31 | Stop reason: SDUPTHER

## 2019-05-06 NOTE — TELEPHONE ENCOUNTER
Pharm requested refill     Health Maintenance   Topic Date Due    Shingles Vaccine (2 of 3) 02/26/2015    Potassium monitoring  01/07/2020    Creatinine monitoring  01/07/2020    DTaP/Tdap/Td vaccine (2 - Td) 03/21/2026    DEXA (modify frequency per FRAX score)  Completed    Flu vaccine  Completed    Pneumococcal 65+ years Vaccine  Completed             (applicable per patient's age: Cancer Screenings, Depression Screening, Fall Risk Screening, Immunizations)    LDL Calculated (mg/dL)   Date Value   02/27/2017 90     AST (U/L)   Date Value   01/31/2018 21     ALT (U/L)   Date Value   01/31/2018 13     BUN (mg/dL)   Date Value   01/31/2018 17      (goal A1C is < 7)   (goal LDL is <100) need 30-50% reduction from baseline     BP Readings from Last 3 Encounters:   03/11/19 (!) 160/80   01/04/19 (!) 138/92   01/22/18 (!) 146/84    (goal /80)      All Future Testing planned in [facility]:  Lab Frequency Next Occurrence   Vitamin D 25 Hydroxy Once 03/22/2019       Next Visit Date:  Future Appointments   Date Time Provider Shabnam Steward   5/17/2019 11:15 AM MD ana rosa Page [facility]   7/5/2019 11:15 AM MD ana rosa Page [facility]            Patient Active Problem List:     Chronic fatigue     Essential hypertension     Mixed hyperlipidemia     Primary insomnia     Multiple sclerosis (HCC)     Chronic migraine without aura without status migrainosus, not intractable     Osteoarthritis     Age-related osteoporosis with current pathological fracture     Diverticulitis     Anemia     Depression     Anxiety     Herpes     Major depressive disorder, recurrent, in full remission ([facility] [address]

## 2019-05-21 ENCOUNTER — OFFICE VISIT (OUTPATIENT)
Dept: FAMILY MEDICINE CLINIC | Age: 84
End: 2019-05-21
Payer: MEDICARE

## 2019-05-21 VITALS
DIASTOLIC BLOOD PRESSURE: 76 MMHG | SYSTOLIC BLOOD PRESSURE: 134 MMHG | HEART RATE: 72 BPM | BODY MASS INDEX: 20 KG/M2 | WEIGHT: 99 LBS

## 2019-05-21 DIAGNOSIS — R10.9 ABDOMINAL CRAMPS: ICD-10-CM

## 2019-05-21 DIAGNOSIS — F33.42 MAJOR DEPRESSIVE DISORDER, RECURRENT, IN FULL REMISSION (HCC): ICD-10-CM

## 2019-05-21 DIAGNOSIS — G35 MULTIPLE SCLEROSIS (HCC): ICD-10-CM

## 2019-05-21 DIAGNOSIS — I10 ESSENTIAL HYPERTENSION: ICD-10-CM

## 2019-05-21 DIAGNOSIS — R53.82 CHRONIC FATIGUE: Primary | ICD-10-CM

## 2019-05-21 DIAGNOSIS — G43.709 CHRONIC MIGRAINE WITHOUT AURA WITHOUT STATUS MIGRAINOSUS, NOT INTRACTABLE: ICD-10-CM

## 2019-05-21 PROCEDURE — 4040F PNEUMOC VAC/ADMIN/RCVD: CPT | Performed by: FAMILY MEDICINE

## 2019-05-21 PROCEDURE — 1123F ACP DISCUSS/DSCN MKR DOCD: CPT | Performed by: FAMILY MEDICINE

## 2019-05-21 PROCEDURE — G8427 DOCREV CUR MEDS BY ELIG CLIN: HCPCS | Performed by: FAMILY MEDICINE

## 2019-05-21 PROCEDURE — G8420 CALC BMI NORM PARAMETERS: HCPCS | Performed by: FAMILY MEDICINE

## 2019-05-21 PROCEDURE — 1036F TOBACCO NON-USER: CPT | Performed by: FAMILY MEDICINE

## 2019-05-21 PROCEDURE — G8399 PT W/DXA RESULTS DOCUMENT: HCPCS | Performed by: FAMILY MEDICINE

## 2019-05-21 PROCEDURE — 1090F PRES/ABSN URINE INCON ASSESS: CPT | Performed by: FAMILY MEDICINE

## 2019-05-21 PROCEDURE — 99214 OFFICE O/P EST MOD 30 MIN: CPT | Performed by: FAMILY MEDICINE

## 2019-05-21 ASSESSMENT — ENCOUNTER SYMPTOMS
EYES NEGATIVE: 1
SHORTNESS OF BREATH: 0
BLOOD IN STOOL: 0
COUGH: 0
ABDOMINAL PAIN: 1
DIARRHEA: 0
NAUSEA: 0
CONSTIPATION: 0
VOMITING: 0

## 2019-05-21 NOTE — PROGRESS NOTES
[facility] PHYS[facility]  MAKEDA QUINONEZ [facility]  [address] [address]  Dept: [phone]    5/21/2019    CHIEF COMPLAINT    Chief Complaint   Patient presents with    Headache    Fatigue    Abdominal Pain       HPI    Darrion Cook is a 82 y.o. female who presents   Chief Complaint   Patient presents with    Headache    Fatigue    Abdominal Pain   . Chronic headaches, improved recently. Takes med prn. Not sleeping well, dogs wake her up during the night and early in the morning. Chronic depression, taking medication. Chronic MS, seeing neurologist.     Abdominal Pain   This is a recurrent problem. The current episode started more than 1 month ago. The problem occurs daily. The problem has been waxing and waning. The pain is located in the generalized abdominal region. The pain is moderate. The quality of the pain is cramping. Associated symptoms include headaches (chronic). Pertinent negatives include no constipation, diarrhea, fever, frequency, nausea or vomiting. The pain is relieved by bowel movements. Treatments tried: miralax. The treatment provided moderate relief. Prior diagnostic workup includes lower endoscopy, GI consult and surgery. Her past medical history is significant for abdominal surgery and irritable bowel syndrome. Fatigue   This is a chronic problem. The current episode started more than 1 year ago. The problem occurs daily. The problem has been unchanged. Associated symptoms include abdominal pain, fatigue, headaches (chronic) and weakness (global). Pertinent negatives include no chest pain, coughing, fever, nausea or vomiting. She has tried rest and sleep for the symptoms. The treatment provided no relief. Vitals:    05/21/19 1015   BP: 134/76   Pulse: 72   Weight: 99 lb (44.9 kg)       REVIEW OF SYSTEMS    Review of Systems   Constitutional: Positive for fatigue. Negative for fever. HENT: Negative. Eyes: Negative. Respiratory: Negative for cough and shortness of breath. Cardiovascular: Negative for chest pain and leg swelling. Gastrointestinal: Positive for abdominal pain. Negative for blood in stool, constipation, diarrhea, nausea and vomiting. Genitourinary: Negative for frequency and urgency. Musculoskeletal: Negative. Skin: Negative. Neurological: Positive for dizziness (if bending down or looking up), weakness (global), light-headedness (when standing or walking) and headaches (chronic). Psychiatric/Behavioral: Positive for dysphoric mood (stable) and sleep disturbance (doesn't fall back asleep if awakened. ). The patient is not nervous/anxious.         PAST MEDICAL HISTORY    Past Medical History:   Diagnosis Date    Abdominal pain     LLQ    Allergy     Anemia     Iron deficiency    Anxiety     Bronchitis     Depression     Diverticulitis     Dizziness     Fracture     Left pubic    Gait disturbance     Genital herpes     GERD (gastroesophageal reflux disease)     Headache     History of ear infections     Hyperlipidemia     Hypertension     Multiple sclerosis (HCC)     Osteoarthritis     Osteoporosis     Parkinsons (HCC)     Pneumonia     Swallowing impairment     Vision disturbance        FAMILY HISTORY    Family History   Problem Relation Age of Onset    Cancer Mother     Glaucoma Mother     Other Mother 50        peritonitis    Diabetes Father     High Blood Pressure Father     Heart Disease Father     Ulcerative Colitis Father     Stroke Father        SOCIAL HISTORY    Social History     Socioeconomic History    Marital status:      Spouse name: None    Number of children: None    Years of education: None    Highest education level: None   Occupational History    None   Social Needs    Financial resource strain: None    Food insecurity:     Worry: None     Inability: None    Transportation needs:     Medical: None     Non-medical: None   Tobacco Use  Smoking status: Former Smoker    Smokeless tobacco: Never Used    Tobacco comment: 40-50   Substance and Sexual Activity    Alcohol use: Yes     Comment: Socially    Drug use: No    Sexual activity: None   Lifestyle    Physical activity:     Days per week: None     Minutes per session: None    Stress: None   Relationships    Social connections:     Talks on phone: None     Gets together: None     Attends Restorationism service: None     Active member of club or organization: None     Attends meetings of clubs or organizations: None     Relationship status: None    Intimate partner violence:     Fear of current or ex partner: None     Emotionally abused: None     Physically abused: None     Forced sexual activity: None   Other Topics Concern    None   Social History Narrative    None       SURGICAL HISTORY    Past Surgical History:   Procedure Laterality Date    APPENDECTOMY      COLONOSCOPY      EYE SURGERY      cataract    HERNIA REPAIR      HYSTERECTOMY      JOINT REPLACEMENT  2007    left shoulder    REVISION SHOULDER ARTHROPLASTY  2017    reverse shoulder, Dr. Jared Brittle    Current Outpatient Medications   Medication Sig Dispense Refill    butalbital-acetaminophen-caffeine (FIORICET, ESGIC) -40 MG per tablet TAKE ONE TABLET BY MOUTH EVERY 6 HOURS AS NEEDED 120 tablet 0    ibandronate (BONIVA) 150 MG tablet Take 1 tablet by mouth every 30 days 3 tablet 3    atenolol (TENORMIN) 25 MG tablet Take 1 tablet by mouth daily 30 tablet 5    simvastatin (ZOCOR) 10 MG tablet Take 1 tablet by mouth nightly 30 tablet 5    doxepin (SINEQUAN) 25 MG capsule Take 2 capsules by mouth 2 times daily 120 capsule 3    Handicap Placard MISC by Does not apply route Expires 5 years from date of initiation 1 each 0    clindamycin (CLEOCIN) 300 MG capsule       Aspirin-Acetaminophen-Caffeine (EXCEDRIN EXTRA STRENGTH PO) Take by mouth      valACYclovir (VALTREX) 500 MG tablet Take 1 tablet by mouth 2 times daily 30 tablet 1     No current facility-administered medications for this visit. ALLERGIES    Allergies   Allergen Reactions    Codeine     Levaquin [Levofloxacin In D5w]     Morphine     Novocain [Procaine]     Penicillins     Percocet [Oxycodone-Acetaminophen]     Sulfa Antibiotics     Vicodin [Hydrocodone-Acetaminophen]        PHYSICAL EXAM   Physical Exam   Constitutional: She is oriented to person, place, and time. She appears well-developed and well-nourished. Frail appearing elderly female   HENT:   Head: Normocephalic. Mouth/Throat: Oropharynx is clear and moist.   Eyes: Pupils are equal, round, and reactive to light. Conjunctivae are normal.   Neck: Normal range of motion. Neck supple. No thyromegaly present. Cardiovascular: Normal rate, regular rhythm and normal heart sounds. No murmur heard. Pulmonary/Chest: Effort normal and breath sounds normal. She has no wheezes. She has no rales. Abdominal: Soft. Bowel sounds are normal. There is no tenderness. There is no rebound and no guarding. Musculoskeletal: Normal range of motion. She exhibits no edema, tenderness or deformity. Lymphadenopathy:     She has no cervical adenopathy. Neurological: She is alert and oriented to person, place, and time. Unsteady gait, off balance   Skin: Skin is warm and dry. Psychiatric: She has a normal mood and affect. Her behavior is normal.   Vitals reviewed. Assessment/PLan  1. Chronic fatigue  Chronic, discussed need for adequate sleep. Discussed possibly giving up dogs that awaken her at night and early am.     2. Major depressive disorder, recurrent, in full remission ([facility] [address]  Cont med. 3. Essential hypertension  Chronic, stable, continue current medication and/or plan      4.  Multiple sclerosis (HCC)  Chronic, stable, continue current medication and/or plan  Cont seeing neurologist.     5. Chronic migraine without aura without status migrainosus, not intractable  Cont prn med    6. Abdominal cramps  Try taking miralax every other day. Luz Elena Ochoa received counseling on the following healthy behaviors: nutrition, exercise and medication adherence  Reviewed prior labs and health maintenance. Continue current medications, diet and exercise. Discussed use, benefit, and side effects of prescribed medications. Barriers to medication compliance addressed. Patient given educational materials - see patient instructions. All patient questions answered. Patient voiced understanding. Return in about 6 months (around 11/21/2019) for htn, fatigue.         Electronically signed by Jesus Olguin MD on 5/21/19 at 10:21 AM

## 2019-05-31 DIAGNOSIS — G43.709 CHRONIC MIGRAINE WITHOUT AURA WITHOUT STATUS MIGRAINOSUS, NOT INTRACTABLE: ICD-10-CM

## 2019-06-02 RX ORDER — BUTALBITAL, ACETAMINOPHEN AND CAFFEINE 50; 325; 40 MG/1; MG/1; MG/1
TABLET ORAL
Qty: 120 TABLET | Refills: 0 | Status: SHIPPED | OUTPATIENT
Start: 2019-06-02 | End: 2019-06-28 | Stop reason: SDUPTHER

## 2019-06-02 NOTE — TELEPHONE ENCOUNTER
Health Maintenance   Topic Date Due    Shingles Vaccine (2 of 3) 02/26/2015    Potassium monitoring  01/07/2020    Creatinine monitoring  01/07/2020    DTaP/Tdap/Td vaccine (2 - Td) 03/21/2026    DEXA (modify frequency per FRAX score)  Completed    Flu vaccine  Completed    Pneumococcal 65+ years Vaccine  Completed             (applicable per patient's age: Cancer Screenings, Depression Screening, Fall Risk Screening, Immunizations)    LDL Calculated (mg/dL)   Date Value   02/27/2017 90     AST (U/L)   Date Value   01/31/2018 21     ALT (U/L)   Date Value   01/31/2018 13     BUN (mg/dL)   Date Value   01/31/2018 17      (goal A1C is < 7)   (goal LDL is <100) need 30-50% reduction from baseline     BP Readings from Last 3 Encounters:   05/21/19 134/76   03/11/19 (!) 160/80   01/04/19 (!) 138/92    (goal /80)      All Future Testing planned in [facility]:  Lab Frequency Next Occurrence   Vitamin D 25 Hydroxy Once 03/22/2019       Next Visit Date:  Future Appointments   Date Time Provider Shabnam Steward   9/23/2019  1:00 PM MD ana rosa Ronquillo fp [facility]            Patient Active Problem List:     Chronic fatigue     Essential hypertension     Mixed hyperlipidemia     Primary insomnia     Multiple sclerosis (HCC)     Chronic migraine without aura without status migrainosus, not intractable     Osteoarthritis     Age-related osteoporosis with current pathological fracture     Diverticulitis     Anemia     Depression     Anxiety     Herpes     Major depressive disorder, recurrent, in full remission ([facility] [address]

## 2019-06-28 DIAGNOSIS — G43.709 CHRONIC MIGRAINE WITHOUT AURA WITHOUT STATUS MIGRAINOSUS, NOT INTRACTABLE: ICD-10-CM

## 2019-06-28 RX ORDER — BUTALBITAL, ACETAMINOPHEN AND CAFFEINE 50; 325; 40 MG/1; MG/1; MG/1
TABLET ORAL
Qty: 120 TABLET | Refills: 0 | Status: SHIPPED | OUTPATIENT
Start: 2019-06-28 | End: 2019-07-24 | Stop reason: SDUPTHER

## 2019-06-28 NOTE — TELEPHONE ENCOUNTER
Pharm requested refill     Health Maintenance   Topic Date Due    Shingles Vaccine (2 of 3) 02/26/2015    Annual Wellness Visit (AWV)  01/04/2020    Potassium monitoring  01/07/2020    Creatinine monitoring  01/07/2020    DTaP/Tdap/Td vaccine (2 - Td) 03/21/2026    DEXA (modify frequency per FRAX score)  Completed    Flu vaccine  Completed    Pneumococcal 65+ years Vaccine  Completed             (applicable per patient's age: Cancer Screenings, Depression Screening, Fall Risk Screening, Immunizations)    LDL Calculated (mg/dL)   Date Value   02/27/2017 90     AST (U/L)   Date Value   01/31/2018 21     ALT (U/L)   Date Value   01/31/2018 13     BUN (mg/dL)   Date Value   01/31/2018 17      (goal A1C is < 7)   (goal LDL is <100) need 30-50% reduction from baseline     BP Readings from Last 3 Encounters:   05/21/19 134/76   03/11/19 (!) 160/80   01/04/19 (!) 138/92    (goal /80)      All Future Testing planned in [facility]:  Lab Frequency Next Occurrence   Vitamin D 25 Hydroxy Once 03/22/2019       Next Visit Date:  Future Appointments   Date Time Provider Shabnam Steward   9/23/2019  1:00 PM MD ana rosa Ayon [facility]            Patient Active Problem List:     Chronic fatigue     Essential hypertension     Mixed hyperlipidemia     Primary insomnia     Multiple sclerosis (HCC)     Chronic migraine without aura without status migrainosus, not intractable     Osteoarthritis     Age-related osteoporosis with current pathological fracture     Diverticulitis     Anemia     Depression     Anxiety     Herpes     Major depressive disorder, recurrent, in full remission ([facility] [address]

## 2019-07-24 DIAGNOSIS — G43.709 CHRONIC MIGRAINE WITHOUT AURA WITHOUT STATUS MIGRAINOSUS, NOT INTRACTABLE: ICD-10-CM

## 2019-07-24 RX ORDER — BUTALBITAL, ACETAMINOPHEN AND CAFFEINE 50; 325; 40 MG/1; MG/1; MG/1
TABLET ORAL
Qty: 120 TABLET | Refills: 0 | Status: SHIPPED | OUTPATIENT
Start: 2019-07-24 | End: 2019-08-23 | Stop reason: SDUPTHER

## 2019-08-23 DIAGNOSIS — G43.709 CHRONIC MIGRAINE WITHOUT AURA WITHOUT STATUS MIGRAINOSUS, NOT INTRACTABLE: ICD-10-CM

## 2019-08-23 RX ORDER — BUTALBITAL, ACETAMINOPHEN AND CAFFEINE 50; 325; 40 MG/1; MG/1; MG/1
TABLET ORAL
Qty: 120 TABLET | Refills: 0 | Status: SHIPPED | OUTPATIENT
Start: 2019-08-23 | End: 2019-09-23 | Stop reason: SDUPTHER

## 2019-09-09 ENCOUNTER — TELEPHONE (OUTPATIENT)
Dept: FAMILY MEDICINE CLINIC | Age: 84
End: 2019-09-09

## 2019-09-09 RX ORDER — AZITHROMYCIN 250 MG/1
TABLET, FILM COATED ORAL
Qty: 1 PACKET | Refills: 0 | Status: SHIPPED | OUTPATIENT
Start: 2019-09-09 | End: 2020-04-27

## 2019-09-23 ENCOUNTER — OFFICE VISIT (OUTPATIENT)
Dept: FAMILY MEDICINE CLINIC | Age: 84
End: 2019-09-23
Payer: MEDICARE

## 2019-09-23 VITALS
BODY MASS INDEX: 20.6 KG/M2 | SYSTOLIC BLOOD PRESSURE: 138 MMHG | HEART RATE: 80 BPM | WEIGHT: 102 LBS | DIASTOLIC BLOOD PRESSURE: 80 MMHG

## 2019-09-23 DIAGNOSIS — M15.9 PRIMARY OSTEOARTHRITIS INVOLVING MULTIPLE JOINTS: ICD-10-CM

## 2019-09-23 DIAGNOSIS — G43.709 CHRONIC MIGRAINE WITHOUT AURA WITHOUT STATUS MIGRAINOSUS, NOT INTRACTABLE: Primary | ICD-10-CM

## 2019-09-23 DIAGNOSIS — E78.2 MIXED HYPERLIPIDEMIA: ICD-10-CM

## 2019-09-23 DIAGNOSIS — M54.31 SCIATICA OF RIGHT SIDE: ICD-10-CM

## 2019-09-23 DIAGNOSIS — Z23 FLU VACCINE NEED: ICD-10-CM

## 2019-09-23 DIAGNOSIS — M54.41 CHRONIC RIGHT-SIDED LOW BACK PAIN WITH RIGHT-SIDED SCIATICA: ICD-10-CM

## 2019-09-23 DIAGNOSIS — G89.29 CHRONIC RIGHT-SIDED LOW BACK PAIN WITH RIGHT-SIDED SCIATICA: ICD-10-CM

## 2019-09-23 DIAGNOSIS — I10 ESSENTIAL HYPERTENSION: ICD-10-CM

## 2019-09-23 DIAGNOSIS — G35 MULTIPLE SCLEROSIS (HCC): ICD-10-CM

## 2019-09-23 PROCEDURE — G8420 CALC BMI NORM PARAMETERS: HCPCS | Performed by: FAMILY MEDICINE

## 2019-09-23 PROCEDURE — 1090F PRES/ABSN URINE INCON ASSESS: CPT | Performed by: FAMILY MEDICINE

## 2019-09-23 PROCEDURE — G8427 DOCREV CUR MEDS BY ELIG CLIN: HCPCS | Performed by: FAMILY MEDICINE

## 2019-09-23 PROCEDURE — 1036F TOBACCO NON-USER: CPT | Performed by: FAMILY MEDICINE

## 2019-09-23 PROCEDURE — 0518F FALL PLAN OF CARE DOCD: CPT | Performed by: FAMILY MEDICINE

## 2019-09-23 PROCEDURE — 90653 IIV ADJUVANT VACCINE IM: CPT | Performed by: FAMILY MEDICINE

## 2019-09-23 PROCEDURE — 99214 OFFICE O/P EST MOD 30 MIN: CPT | Performed by: FAMILY MEDICINE

## 2019-09-23 PROCEDURE — 3288F FALL RISK ASSESSMENT DOCD: CPT | Performed by: FAMILY MEDICINE

## 2019-09-23 PROCEDURE — G8399 PT W/DXA RESULTS DOCUMENT: HCPCS | Performed by: FAMILY MEDICINE

## 2019-09-23 PROCEDURE — 1123F ACP DISCUSS/DSCN MKR DOCD: CPT | Performed by: FAMILY MEDICINE

## 2019-09-23 PROCEDURE — 4040F PNEUMOC VAC/ADMIN/RCVD: CPT | Performed by: FAMILY MEDICINE

## 2019-09-23 PROCEDURE — G0008 ADMIN INFLUENZA VIRUS VAC: HCPCS | Performed by: FAMILY MEDICINE

## 2019-09-23 RX ORDER — BUTALBITAL, ACETAMINOPHEN AND CAFFEINE 50; 325; 40 MG/1; MG/1; MG/1
TABLET ORAL
Qty: 120 TABLET | Refills: 0 | Status: SHIPPED | OUTPATIENT
Start: 2019-09-23 | End: 2019-10-20 | Stop reason: SDUPTHER

## 2019-09-23 RX ORDER — SIMVASTATIN 10 MG
10 TABLET ORAL NIGHTLY
Qty: 30 TABLET | Refills: 5 | Status: SHIPPED | OUTPATIENT
Start: 2019-09-23

## 2019-09-23 RX ORDER — ATENOLOL 25 MG/1
25 TABLET ORAL DAILY
Qty: 30 TABLET | Refills: 5 | Status: SHIPPED | OUTPATIENT
Start: 2019-09-23 | End: 2021-05-04 | Stop reason: ALTCHOICE

## 2019-09-23 ASSESSMENT — ENCOUNTER SYMPTOMS
BLURRED VISION: 1
COUGH: 0
ABDOMINAL PAIN: 0
BACK PAIN: 1
BLOOD IN STOOL: 0
CONSTIPATION: 0
SHORTNESS OF BREATH: 0

## 2019-09-23 NOTE — PROGRESS NOTES
82   Weight: 102 lb (46.3 kg)       REVIEW OF SYSTEMS    Review of Systems   Constitutional: Positive for fatigue. Negative for fever and unexpected weight change. HENT: Negative. Eyes: Positive for blurred vision. Respiratory: Negative for cough and shortness of breath. Cardiovascular: Positive for palpitations (mild, intermittent, lasts a few seconds). Negative for chest pain and leg swelling. Gastrointestinal: Negative for abdominal pain, blood in stool and constipation. Dysphagia, seems to be blocked on rt side, has to wait for esophagus to relax before she can swallow. Has had prior swallow test, suggested thickening of fluids. Genitourinary: Negative for frequency and urgency. Musculoskeletal: Positive for back pain. Skin: Negative. Neurological: Positive for weakness (global) and headaches (see hpi). Negative for dizziness. Psychiatric/Behavioral: The patient is nervous/anxious and has insomnia.         PAST MEDICAL HISTORY    Past Medical History:   Diagnosis Date    Abdominal pain     LLQ    Allergy     Anemia     Iron deficiency    Anxiety     Bronchitis     Depression     Diverticulitis     Dizziness     Fracture     Left pubic    Gait disturbance     Genital herpes     GERD (gastroesophageal reflux disease)     Headache     History of ear infections     Hyperlipidemia     Hypertension     Multiple sclerosis (HCC)     Osteoarthritis     Osteoporosis     Parkinsons (HCC)     Pneumonia     Swallowing impairment     Vision disturbance        FAMILY HISTORY    Family History   Problem Relation Age of Onset    Cancer Mother     Glaucoma Mother     Other Mother 50        peritonitis    Diabetes Father     High Blood Pressure Father     Heart Disease Father     Ulcerative Colitis Father     Stroke Father        SOCIAL HISTORY    Social History     Socioeconomic History    Marital status:      Spouse name: None    Number of children: None    Years of education: None    Highest education level: None   Occupational History    None   Social Needs    Financial resource strain: None    Food insecurity:     Worry: None     Inability: None    Transportation needs:     Medical: None     Non-medical: None   Tobacco Use    Smoking status: Former Smoker    Smokeless tobacco: Never Used    Tobacco comment: 40-50   Substance and Sexual Activity    Alcohol use: Yes     Comment: Socially    Drug use: No    Sexual activity: None   Lifestyle    Physical activity:     Days per week: None     Minutes per session: None    Stress: None   Relationships    Social connections:     Talks on phone: None     Gets together: None     Attends Tenriism service: None     Active member of club or organization: None     Attends meetings of clubs or organizations: None     Relationship status: None    Intimate partner violence:     Fear of current or ex partner: None     Emotionally abused: None     Physically abused: None     Forced sexual activity: None   Other Topics Concern    None   Social History Narrative    None       SURGICAL HISTORY    Past Surgical History:   Procedure Laterality Date    APPENDECTOMY      COLONOSCOPY      EYE SURGERY      cataract    HERNIA REPAIR      HYSTERECTOMY      JOINT REPLACEMENT  2007    left shoulder    REVISION SHOULDER ARTHROPLASTY  2017    reverse shoulder, Dr. Allyson Calixto    Current Outpatient Medications   Medication Sig Dispense Refill    atenolol (TENORMIN) 25 MG tablet Take 1 tablet by mouth daily 30 tablet 5    simvastatin (ZOCOR) 10 MG tablet Take 1 tablet by mouth nightly 30 tablet 5    butalbital-acetaminophen-caffeine (FIORICET, ESGIC) -40 MG per tablet 1-2 q 4-6 hours prn headache 120 tablet 0    ibandronate (BONIVA) 150 MG tablet Take 1 tablet by mouth every 30 days 3 tablet 3   

## 2019-10-20 DIAGNOSIS — G43.709 CHRONIC MIGRAINE WITHOUT AURA WITHOUT STATUS MIGRAINOSUS, NOT INTRACTABLE: ICD-10-CM

## 2019-10-21 RX ORDER — BUTALBITAL, ACETAMINOPHEN AND CAFFEINE 50; 325; 40 MG/1; MG/1; MG/1
TABLET ORAL
Qty: 120 TABLET | Refills: 0 | Status: SHIPPED | OUTPATIENT
Start: 2019-10-21 | End: 2019-11-18 | Stop reason: SDUPTHER

## 2019-11-18 DIAGNOSIS — G43.709 CHRONIC MIGRAINE WITHOUT AURA WITHOUT STATUS MIGRAINOSUS, NOT INTRACTABLE: ICD-10-CM

## 2019-11-18 RX ORDER — BUTALBITAL, ACETAMINOPHEN AND CAFFEINE 50; 325; 40 MG/1; MG/1; MG/1
TABLET ORAL
Qty: 120 TABLET | Refills: 0 | Status: SHIPPED | OUTPATIENT
Start: 2019-11-18 | End: 2019-12-14 | Stop reason: SDUPTHER

## 2019-12-14 DIAGNOSIS — G43.709 CHRONIC MIGRAINE WITHOUT AURA WITHOUT STATUS MIGRAINOSUS, NOT INTRACTABLE: ICD-10-CM

## 2019-12-16 RX ORDER — BUTALBITAL, ACETAMINOPHEN AND CAFFEINE 50; 325; 40 MG/1; MG/1; MG/1
TABLET ORAL
Qty: 120 TABLET | Refills: 0 | Status: SHIPPED | OUTPATIENT
Start: 2019-12-16 | End: 2020-01-14

## 2020-01-14 RX ORDER — BUTALBITAL, ACETAMINOPHEN AND CAFFEINE 50; 325; 40 MG/1; MG/1; MG/1
TABLET ORAL
Qty: 120 TABLET | Refills: 0 | Status: SHIPPED | OUTPATIENT
Start: 2020-01-14 | End: 2020-02-14

## 2020-01-14 NOTE — TELEPHONE ENCOUNTER
Pharmacy request    Health Maintenance   Topic Date Due    Shingles Vaccine (2 of 3) 02/26/2015    Annual Wellness Visit (AWV)  05/29/2019    Potassium monitoring  01/07/2020    Creatinine monitoring  01/07/2020    Lipid screen  01/07/2020    DTaP/Tdap/Td vaccine (3 - Td) 02/10/2029    DEXA (modify frequency per FRAX score)  Completed    Flu vaccine  Completed    Pneumococcal 65+ years Vaccine  Completed             (applicable per patient's age: Cancer Screenings, Depression Screening, Fall Risk Screening, Immunizations)    LDL Calculated (mg/dL)   Date Value   02/27/2017 90     AST (U/L)   Date Value   01/31/2018 21     ALT (U/L)   Date Value   01/31/2018 13     BUN (mg/dL)   Date Value   01/31/2018 17      (goal A1C is < 7)   (goal LDL is <100) need 30-50% reduction from baseline     BP Readings from Last 3 Encounters:   09/23/19 138/80   05/21/19 134/76   03/11/19 (!) 160/80    (goal /80)      All Future Testing planned in [facility]:  Lab Frequency Next Occurrence       Next Visit Date:  Future Appointments   Date Time Provider Shabnam Steward   3/24/2020  1:00 PM MD ana rosa Cormier fp [facility]            Patient Active Problem List:     Chronic fatigue     Essential hypertension     Mixed hyperlipidemia     Primary insomnia     Multiple sclerosis (HCC)     Chronic migraine without aura without status migrainosus, not intractable     Osteoarthritis     Age-related osteoporosis with current pathological fracture     Diverticulitis     Anemia     Depression     Anxiety     Herpes     Major depressive disorder, recurrent, in full remission ([facility] [address]

## 2020-02-14 RX ORDER — BUTALBITAL, ACETAMINOPHEN AND CAFFEINE 50; 325; 40 MG/1; MG/1; MG/1
TABLET ORAL
Qty: 120 TABLET | Refills: 0 | Status: SHIPPED | OUTPATIENT
Start: 2020-02-14 | End: 2020-03-12

## 2020-02-14 NOTE — TELEPHONE ENCOUNTER
Pharm requested refill  Health Maintenance   Topic Date Due    Shingles Vaccine (2 of 3) 02/26/2015    Annual Wellness Visit (AWV)  05/29/2019    Lipid screen  01/07/2020    Potassium monitoring  01/07/2020    Creatinine monitoring  01/07/2020    DTaP/Tdap/Td vaccine (3 - Td) 02/10/2029    DEXA (modify frequency per FRAX score)  Completed    Flu vaccine  Completed    Pneumococcal 65+ years Vaccine  Completed    Hepatitis A vaccine  Aged Out    Hepatitis B vaccine  Aged Out    Hib vaccine  Aged Out    Meningococcal (ACWY) vaccine  Aged Out             (applicable per patient's age: Cancer Screenings, Depression Screening, Fall Risk Screening, Immunizations)    LDL Calculated (mg/dL)   Date Value   02/27/2017 90     AST (U/L)   Date Value   01/31/2018 21     ALT (U/L)   Date Value   01/31/2018 13     BUN (mg/dL)   Date Value   01/31/2018 17      (goal A1C is < 7)   (goal LDL is <100) need 30-50% reduction from baseline     BP Readings from Last 3 Encounters:   09/23/19 138/80   05/21/19 134/76   03/11/19 (!) 160/80    (goal /80)      All Future Testing planned in [facility]:  Lab Frequency Next Occurrence       Next Visit Date:  Future Appointments   Date Time Provider Shabnam Steward   3/24/2020  1:00 PM MD ana rosa Park [facility]            Patient Active Problem List:     Chronic fatigue     Essential hypertension     Mixed hyperlipidemia     Primary insomnia     Multiple sclerosis (HCC)     Chronic migraine without aura without status migrainosus, not intractable     Osteoarthritis     Age-related osteoporosis with current pathological fracture     Diverticulitis     Anemia     Depression     Anxiety     Herpes     Major depressive disorder, recurrent, in full remission ([facility] [address]

## 2020-03-12 RX ORDER — BUTALBITAL, ACETAMINOPHEN AND CAFFEINE 50; 325; 40 MG/1; MG/1; MG/1
TABLET ORAL
Qty: 120 TABLET | Refills: 0 | Status: SHIPPED | OUTPATIENT
Start: 2020-03-12 | End: 2020-04-09

## 2020-03-12 NOTE — TELEPHONE ENCOUNTER
Pharm requested refill    Health Maintenance   Topic Date Due    Shingles Vaccine (2 of 3) 02/26/2015    Annual Wellness Visit (AWV)  05/29/2019    Lipid screen  01/07/2020    Potassium monitoring  01/07/2020    Creatinine monitoring  01/07/2020    DTaP/Tdap/Td vaccine (3 - Td) 02/10/2029    DEXA (modify frequency per FRAX score)  Completed    Flu vaccine  Completed    Pneumococcal 65+ years Vaccine  Completed    Hepatitis A vaccine  Aged Out    Hepatitis B vaccine  Aged Out    Hib vaccine  Aged Out    Meningococcal (ACWY) vaccine  Aged Out             (applicable per patient's age: Cancer Screenings, Depression Screening, Fall Risk Screening, Immunizations)    LDL Calculated (mg/dL)   Date Value   02/27/2017 90     AST (U/L)   Date Value   01/31/2018 21     ALT (U/L)   Date Value   01/31/2018 13     BUN (mg/dL)   Date Value   01/31/2018 17      (goal A1C is < 7)   (goal LDL is <100) need 30-50% reduction from baseline     BP Readings from Last 3 Encounters:   09/23/19 138/80   05/21/19 134/76   03/11/19 (!) 160/80    (goal /80)      All Future Testing planned in [facility]:  Lab Frequency Next Occurrence       Next Visit Date:  Future Appointments   Date Time Provider Shabnam Steward   3/24/2020  1:00 PM Yris Officer, MD ana rosa trin[place] [facility]            Patient Active Problem List:     Chronic fatigue     Essential hypertension     Mixed hyperlipidemia     Primary insomnia     Multiple sclerosis (HCC)     Chronic migraine without aura without status migrainosus, not intractable     Osteoarthritis     Age-related osteoporosis with current pathological fracture     Diverticulitis     Anemia     Depression     Anxiety     Herpes     Major depressive disorder, recurrent, in full remission ([facility] [address]

## 2020-04-09 RX ORDER — BUTALBITAL, ACETAMINOPHEN AND CAFFEINE 50; 325; 40 MG/1; MG/1; MG/1
TABLET ORAL
Qty: 120 TABLET | Refills: 0 | Status: SHIPPED | OUTPATIENT
Start: 2020-04-09 | End: 2020-04-25 | Stop reason: SDUPTHER

## 2020-04-25 ENCOUNTER — TELEPHONE (OUTPATIENT)
Dept: FAMILY MEDICINE CLINIC | Age: 85
End: 2020-04-25

## 2020-04-25 RX ORDER — BUTALBITAL, ACETAMINOPHEN AND CAFFEINE 50; 325; 40 MG/1; MG/1; MG/1
1 TABLET ORAL EVERY 6 HOURS PRN
Qty: 120 TABLET | Refills: 1 | Status: SHIPPED | OUTPATIENT
Start: 2020-04-25 | End: 2020-05-08

## 2020-04-25 RX ORDER — PROMETHAZINE HYDROCHLORIDE 12.5 MG/1
12.5 TABLET ORAL 3 TIMES DAILY PRN
Qty: 30 TABLET | Refills: 1 | Status: SHIPPED | OUTPATIENT
Start: 2020-04-25 | End: 2020-05-02

## 2020-04-27 ENCOUNTER — CARE COORDINATION (OUTPATIENT)
Dept: CARE COORDINATION | Age: 85
End: 2020-04-27

## 2020-04-27 ENCOUNTER — TELEMEDICINE (OUTPATIENT)
Dept: FAMILY MEDICINE CLINIC | Age: 85
End: 2020-04-27
Payer: MEDICARE

## 2020-04-27 VITALS — DIASTOLIC BLOOD PRESSURE: 83 MMHG | SYSTOLIC BLOOD PRESSURE: 180 MMHG | WEIGHT: 95 LBS | BODY MASS INDEX: 19.19 KG/M2

## 2020-04-27 PROCEDURE — 99442 PR PHYS/QHP TELEPHONE EVALUATION 11-20 MIN: CPT | Performed by: FAMILY MEDICINE

## 2020-04-27 SDOH — SOCIAL STABILITY: SOCIAL NETWORK: HOW OFTEN DO YOU ATTEND CHURCH OR RELIGIOUS SERVICES?: 1 TO 4 TIMES PER YEAR

## 2020-04-27 SDOH — HEALTH STABILITY: PHYSICAL HEALTH: ON AVERAGE, HOW MANY MINUTES DO YOU ENGAGE IN EXERCISE AT THIS LEVEL?: 20 MIN

## 2020-04-27 SDOH — SOCIAL STABILITY: SOCIAL NETWORK: ARE YOU MARRIED, WIDOWED, DIVORCED, SEPARATED, NEVER MARRIED, OR LIVING WITH A PARTNER?: MARRIED

## 2020-04-27 SDOH — ECONOMIC STABILITY: FOOD INSECURITY: WITHIN THE PAST 12 MONTHS, YOU WORRIED THAT YOUR FOOD WOULD RUN OUT BEFORE YOU GOT MONEY TO BUY MORE.: NEVER TRUE

## 2020-04-27 SDOH — SOCIAL STABILITY: SOCIAL NETWORK
DO YOU BELONG TO ANY CLUBS OR ORGANIZATIONS SUCH AS CHURCH GROUPS UNIONS, FRATERNAL OR ATHLETIC GROUPS, OR SCHOOL GROUPS?: NO

## 2020-04-27 SDOH — SOCIAL STABILITY: SOCIAL NETWORK: HOW OFTEN DO YOU GET TOGETHER WITH FRIENDS OR RELATIVES?: NEVER

## 2020-04-27 SDOH — HEALTH STABILITY: MENTAL HEALTH
STRESS IS WHEN SOMEONE FEELS TENSE, NERVOUS, ANXIOUS, OR CAN'T SLEEP AT NIGHT BECAUSE THEIR MIND IS TROUBLED. HOW STRESSED ARE YOU?: RATHER MUCH

## 2020-04-27 SDOH — ECONOMIC STABILITY: FOOD INSECURITY: WITHIN THE PAST 12 MONTHS, THE FOOD YOU BOUGHT JUST DIDN'T LAST AND YOU DIDN'T HAVE MONEY TO GET MORE.: NEVER TRUE

## 2020-04-27 SDOH — HEALTH STABILITY: PHYSICAL HEALTH: ON AVERAGE, HOW MANY DAYS PER WEEK DO YOU ENGAGE IN MODERATE TO STRENUOUS EXERCISE (LIKE A BRISK WALK)?: 2 DAYS

## 2020-04-27 SDOH — ECONOMIC STABILITY: INCOME INSECURITY: HOW HARD IS IT FOR YOU TO PAY FOR THE VERY BASICS LIKE FOOD, HOUSING, MEDICAL CARE, AND HEATING?: NOT HARD AT ALL

## 2020-04-27 SDOH — ECONOMIC STABILITY: TRANSPORTATION INSECURITY
IN THE PAST 12 MONTHS, HAS LACK OF TRANSPORTATION KEPT YOU FROM MEETINGS, WORK, OR FROM GETTING THINGS NEEDED FOR DAILY LIVING?: NO

## 2020-04-27 SDOH — HEALTH STABILITY: MENTAL HEALTH: HOW MANY STANDARD DRINKS CONTAINING ALCOHOL DO YOU HAVE ON A TYPICAL DAY?: 1 OR 2

## 2020-04-27 SDOH — SOCIAL STABILITY: SOCIAL NETWORK: IN A TYPICAL WEEK, HOW MANY TIMES DO YOU TALK ON THE PHONE WITH FAMILY, FRIENDS, OR NEIGHBORS?: TWICE A WEEK

## 2020-04-27 SDOH — SOCIAL STABILITY: SOCIAL NETWORK: HOW OFTEN DO YOU ATTENT MEETINGS OF THE CLUB OR ORGANIZATION YOU BELONG TO?: NEVER

## 2020-04-27 SDOH — ECONOMIC STABILITY: TRANSPORTATION INSECURITY
IN THE PAST 12 MONTHS, HAS THE LACK OF TRANSPORTATION KEPT YOU FROM MEDICAL APPOINTMENTS OR FROM GETTING MEDICATIONS?: NO

## 2020-04-27 SDOH — HEALTH STABILITY: MENTAL HEALTH: HOW OFTEN DO YOU HAVE A DRINK CONTAINING ALCOHOL?: MONTHLY OR LESS

## 2020-04-27 ASSESSMENT — ENCOUNTER SYMPTOMS
BLOOD IN STOOL: 0
ABDOMINAL PAIN: 0
COUGH: 0
DIARRHEA: 0
ALLERGIC/IMMUNOLOGIC NEGATIVE: 1
CONSTIPATION: 0
SHORTNESS OF BREATH: 0
PHOTOPHOBIA: 1

## 2020-04-27 NOTE — PROGRESS NOTES
[facility]YS[facility]  MAKEDA QUINONEZ [facility] [place]  [address] [facility] [address] [facility]  Dept: [phone]    4/27/2020    Consent:  She and/or health care decision maker is aware that that she may receive a bill for this telephone service, depending on her insurance coverage, and has provided verbal consent to proceed: Yes    CHIEF Cara Church is a 82 y.o. female evaluated via telephone on 4/27/2020. HPI    Laura Le is a 82 y.o. female who presents   Chief Complaint   Patient presents with    Fatigue   . Telephone call as pt was not able to connect to video. Recheck chronic headache. She did go to ER 2 days ago, was given iv fluids for dehydration. Has been very fatigued. Admits to stress at home. Her  is difficult for her to deal with. He needs a lot of care. She says they should be in assisted living but he won't go. Her daughters are helpful when they can be. She does see a neurologist for MS. Hx of htn. Taking medications as prescribed with no side effects and good effectiveness. bp has been as high as 180. Has not been checking bp at home. Fatigue   This is a chronic problem. The problem occurs constantly. The problem has been waxing and waning. Associated symptoms include arthralgias, fatigue, headaches and weakness. Pertinent negatives include no abdominal pain, chest pain, coughing or fever. The symptoms are aggravated by stress. She has tried rest for the symptoms. The treatment provided mild relief. Headache    This is a chronic problem. The current episode started more than 1 year ago. The problem occurs intermittently (a few times a week). The problem has been waxing and waning. The pain quality is similar to prior headaches. The pain is severe. Associated symptoms include insomnia (taking doxepin), photophobia and weakness. Pertinent negatives include no abdominal pain, coughing, dizziness or fever.  The symptoms are aggravated by activity and bright light. Treatments tried: fioricet. The treatment provided moderate relief. Her past medical history is significant for hypertension and migraine headaches. Vitals:    04/27/20 1301   BP: (!) 180/83   Weight: 95 lb (43.1 kg)       REVIEW OF SYSTEMS    Review of Systems   Constitutional: Positive for fatigue. Negative for fever and unexpected weight change. HENT: Negative. Eyes: Positive for photophobia. Respiratory: Negative for cough and shortness of breath. Cardiovascular: Negative for chest pain, palpitations and leg swelling. Gastrointestinal: Negative for abdominal pain, blood in stool, constipation and diarrhea. Endocrine: Negative. Genitourinary: Negative for frequency and urgency. Musculoskeletal: Positive for arthralgias. Skin: Negative. Allergic/Immunologic: Negative. Neurological: Positive for weakness and headaches. Negative for dizziness. Hematological: Negative. Psychiatric/Behavioral: Positive for sleep disturbance. The patient is nervous/anxious and has insomnia (taking doxepin). Stress at home.  Sleeping with doxepin       PAST MEDICAL HISTORY    Past Medical History:   Diagnosis Date    Abdominal pain     LLQ    Allergy     Anemia     Iron deficiency    Anxiety     Bronchitis     Depression     Diverticulitis     Dizziness     Fracture     Left pubic    Gait disturbance     Genital herpes     GERD (gastroesophageal reflux disease)     Headache     History of ear infections     Hyperlipidemia     Hypertension     Multiple sclerosis (HCC)     Osteoarthritis     Osteoporosis     Parkinsons (HCC)     Pneumonia     Swallowing impairment     Vision disturbance        FAMILY HISTORY    Family History   Problem Relation Age of Onset    Cancer Mother     Glaucoma Mother     Other Mother 50        peritonitis    Diabetes Father     High Blood Pressure Father     Heart Disease Father     Ulcerative tablet Take 1 tablet by mouth 3 times daily as needed for Nausea 30 tablet 1    atenolol (TENORMIN) 25 MG tablet Take 1 tablet by mouth daily 30 tablet 5    simvastatin (ZOCOR) 10 MG tablet Take 1 tablet by mouth nightly 30 tablet 5    ibandronate (BONIVA) 150 MG tablet Take 1 tablet by mouth every 30 days 3 tablet 3    valACYclovir (VALTREX) 500 MG tablet Take 1 tablet by mouth 2 times daily 30 tablet 1    doxepin (SINEQUAN) 25 MG capsule Take 2 capsules by mouth 2 times daily 120 capsule 3    Handicap Placard MISC by Does not apply route Expires 5 years from date of initiation 1 each 0    clindamycin (CLEOCIN) 300 MG capsule       Aspirin-Acetaminophen-Caffeine (EXCEDRIN EXTRA STRENGTH PO) Take by mouth       No current facility-administered medications for this visit. ALLERGIES    Allergies   Allergen Reactions    Codeine     Levaquin [Levofloxacin In D5w]     Morphine     Novocain [Procaine]     Penicillins     Percocet [Oxycodone-Acetaminophen]     Sulfa Antibiotics     Vicodin [Hydrocodone-Acetaminophen]        PHYSICAL EXAM   Physical Exam  Vitals signs reviewed. Neurological:      Mental Status: She is alert. Psychiatric:         Mood and Affect: Mood normal.         Behavior: Behavior normal.         Thought Content: Thought content normal.         Judgment: Judgment normal.         Disha received counseling on the following healthy behaviors: nutrition, exercise and medication adherence  Reviewed prior labs and health maintenance. Continue current medications, diet and exercise. Discussed use, benefit, and side effects of prescribed medications. Barriers to medication compliance addressed. Patient given educational materials - see patient instructions. All patient questions answered. Patient voiced understanding. ASSESSMENT/PLAN  1. Chronic fatigue  Cont efforts to maintain weight, good nutrition and sleep. Will have nurse navigator call her.      2. Essential

## 2020-05-07 ENCOUNTER — TELEPHONE (OUTPATIENT)
Dept: FAMILY MEDICINE CLINIC | Age: 85
End: 2020-05-07

## 2020-05-08 RX ORDER — BUTALBITAL, ACETAMINOPHEN AND CAFFEINE 50; 325; 40 MG/1; MG/1; MG/1
1 TABLET ORAL EVERY 4 HOURS PRN
Qty: 120 TABLET | Refills: 0 | Status: SHIPPED | OUTPATIENT
Start: 2020-05-08 | End: 2020-05-21 | Stop reason: DRUGHIGH

## 2020-05-08 NOTE — TELEPHONE ENCOUNTER
If she is out she is taking at least 10 daily. This is too much. I am going to limit quantity going forward. She needs to talk to her neurologist about her headaches. Inform pt the rx has to last 20 days which is a max of 6 daily.

## 2020-05-11 ENCOUNTER — CARE COORDINATION (OUTPATIENT)
Dept: CARE COORDINATION | Age: 85
End: 2020-05-11

## 2020-05-11 NOTE — CARE COORDINATION
MD Bobbi Conde MISC by Does not apply route Expires 5 years from date of initiation 7/14/17   Dat Corral APRN - CNP   clindamycin (CLEOCIN) 300 MG capsule  1/19/17   Historical Provider, MD   Aspirin-Acetaminophen-Caffeine (EXCEDRIN EXTRA STRENGTH PO) Take by mouth    Historical Provider, MD       Future Appointments   Date Time Provider Shabnam Steward   5/21/2020  1:00 PM MD ana rosa Gil [facility]

## 2020-05-18 ENCOUNTER — TELEPHONE (OUTPATIENT)
Dept: FAMILY MEDICINE CLINIC | Age: 85
End: 2020-05-18

## 2020-05-18 ENCOUNTER — CARE COORDINATION (OUTPATIENT)
Dept: CARE COORDINATION | Age: 85
End: 2020-05-18

## 2020-05-18 NOTE — CARE COORDINATION
Ambulatory Care Coordination Note  5/18/2020  CM Risk Score: 0  Charlson 10 Year Mortality Risk Score: 47%     ACC: Chris Carlson, RN    Summary Note: spoke with pt who said she is still feeling fatigued. She did see her cardiologist who ordered her a stress test and an echo she has these scheduled for first week of June. She will see her pcp this Thursday. She denies any needs at this time   1) fu with headaches  2) fu with stress  3) fu with Dr Herman Bailey  4) fu with [facility] Coordination Interventions    Program Enrollment:  Rising Risk  Referral from Primary Care Provider:  Yes  Suggested Interventions and Community Resources  Fall Risk Prevention: In Process         Goals Addressed                 This Visit's Progress     Reduce Falls    On track     I will reduce my risk of falls by the following: Remove rugs or use non slip rugs    Barriers: lack of support and overwhelmed by complexity of regimen  Plan for overcoming my barriers: N/A  Confidence: 8/10   Anticipated Goal Completion Date: 7/27/20            Prior to Admission medications    Medication Sig Start Date End Date Taking?  Authorizing Provider   butalbital-acetaminophen-caffeine (FIORICET, ESGIC) -62 MG per tablet Take 1 tablet by mouth every 4 hours as needed for Headaches 5/8/20 5/28/20  Alexis Louis MD   atenolol (TENORMIN) 25 MG tablet Take 1 tablet by mouth daily 9/23/19   Alexis oLuis MD   simvastatin (ZOCOR) 10 MG tablet Take 1 tablet by mouth nightly 9/23/19   Alexis Louis MD   ibandronate (BONIVA) 150 MG tablet Take 1 tablet by mouth every 30 days 1/4/19   Alexis Louis MD   valACYclovir (VALTREX) 500 MG tablet Take 1 tablet by mouth 2 times daily 1/4/19   Alexis Louis MD   doxepin [facility] 25 MG capsule Take 2 capsules by mouth 2 times daily 1/4/19   Alexis Louis MD   Handicap Placard MISC by Does not apply route Expires 5 years from date of initiation 7/14/17   ANA PAULA Guillory - PAMELA   clindamycin

## 2020-05-18 NOTE — TELEPHONE ENCOUNTER
Pt daughter calling concern about Fioricet use. Daughter states she's taken \"84 pill a week\". And always runs out after every refill also receives injections to manage headaches. Daughter is very concerned about her well being and mental state as far as personal issues with her . Please advise. Daughter would like a call back.

## 2020-05-21 ENCOUNTER — OFFICE VISIT (OUTPATIENT)
Dept: FAMILY MEDICINE CLINIC | Age: 85
End: 2020-05-21
Payer: MEDICARE

## 2020-05-21 ENCOUNTER — HOSPITAL ENCOUNTER (OUTPATIENT)
Age: 85
Setting detail: SPECIMEN
Discharge: HOME | End: 2020-05-21
Payer: MEDICARE

## 2020-05-21 VITALS
DIASTOLIC BLOOD PRESSURE: 76 MMHG | SYSTOLIC BLOOD PRESSURE: 134 MMHG | OXYGEN SATURATION: 98 % | WEIGHT: 101 LBS | TEMPERATURE: 99 F | BODY MASS INDEX: 20.4 KG/M2 | HEART RATE: 106 BPM

## 2020-05-21 PROCEDURE — 1036F TOBACCO NON-USER: CPT | Performed by: FAMILY MEDICINE

## 2020-05-21 PROCEDURE — 0518F FALL PLAN OF CARE DOCD: CPT | Performed by: FAMILY MEDICINE

## 2020-05-21 PROCEDURE — 1123F ACP DISCUSS/DSCN MKR DOCD: CPT | Performed by: FAMILY MEDICINE

## 2020-05-21 PROCEDURE — G8420 CALC BMI NORM PARAMETERS: HCPCS | Performed by: FAMILY MEDICINE

## 2020-05-21 PROCEDURE — 3288F FALL RISK ASSESSMENT DOCD: CPT | Performed by: FAMILY MEDICINE

## 2020-05-21 PROCEDURE — 99214 OFFICE O/P EST MOD 30 MIN: CPT | Performed by: FAMILY MEDICINE

## 2020-05-21 PROCEDURE — 36415 COLL VENOUS BLD VENIPUNCTURE: CPT | Performed by: FAMILY MEDICINE

## 2020-05-21 PROCEDURE — 4040F PNEUMOC VAC/ADMIN/RCVD: CPT | Performed by: FAMILY MEDICINE

## 2020-05-21 PROCEDURE — 1090F PRES/ABSN URINE INCON ASSESS: CPT | Performed by: FAMILY MEDICINE

## 2020-05-21 PROCEDURE — G8399 PT W/DXA RESULTS DOCUMENT: HCPCS | Performed by: FAMILY MEDICINE

## 2020-05-21 PROCEDURE — G8427 DOCREV CUR MEDS BY ELIG CLIN: HCPCS | Performed by: FAMILY MEDICINE

## 2020-05-21 RX ORDER — BUTALBITAL, ACETAMINOPHEN AND CAFFEINE 50; 325; 40 MG/1; MG/1; MG/1
1 TABLET ORAL
Qty: 100 TABLET | Refills: 0 | Status: SHIPPED
Start: 2020-05-21 | End: 2020-05-21 | Stop reason: SDUPTHER

## 2020-05-21 RX ORDER — BUTALBITAL, ACETAMINOPHEN AND CAFFEINE 50; 325; 40 MG/1; MG/1; MG/1
1 TABLET ORAL
Qty: 100 TABLET | Refills: 0 | Status: SHIPPED | OUTPATIENT
Start: 2020-05-21 | End: 2020-06-21

## 2020-05-21 ASSESSMENT — ENCOUNTER SYMPTOMS
ABDOMINAL PAIN: 0
BLOOD IN STOOL: 0
SHORTNESS OF BREATH: 0
CONSTIPATION: 1
EYES NEGATIVE: 1
COUGH: 0
DIARRHEA: 0
ALLERGIC/IMMUNOLOGIC NEGATIVE: 1

## 2020-05-21 NOTE — PROGRESS NOTES
[facility]  [facility]  [address]  [facility] [address]  Dept: [phone]    5/21/2020    CHIEF COMPLAINT    Chief Complaint   Patient presents with    Headache    Medication Check       HPI    Dio Moore is a 82 y.o. female who presents   Chief Complaint   Patient presents with    Headache    Medication Check   . Recheck chronic headaches. Taking medication daily. Had an episode at the grocery store where she almost passed out and thought it was due to wearing a mask. Has been having a heaviness in middle of chest and left side. Present most of the time. Feeling sob frequently. Talked to cardiologist and has an echo and stress test scheduled in June. Pt is taking fioricet 5 pills daily. Admits that she takes it for a \"sedative\" effect due to having to care for her  who needs more care than she can handle. She says they need to be in assisted living but he refuses to go. Has a nurse coming for her  and she was going to look into getting some housekeeping help. Had an ER visit in early may for elevated bp and dehydration. Seeing Dr. Titi Reed for hx of MS who has prescribed doxepin at night for sleep and depression. It does help her sleep. Still feeling depressed due to situation at home. She did talk to nurse navigator and thought she would like to talk to her again. Headache    This is a chronic problem. The current episode started more than 1 year ago. The problem occurs daily. The pain is located in the frontal region. The quality of the pain is described as dull. Associated symptoms include weakness. Pertinent negatives include no abdominal pain, coughing, dizziness or fever. The symptoms are aggravated by fatigue. Treatments tried: fioricet. The treatment provided mild relief.        Vitals:    05/21/20 1304   BP: 134/76   Pulse: 106   Temp: 99 °F (37.2 °C)   SpO2: 98%   Weight: 101 lb (45.8 kg)       REVIEW OF SYSTEMS    Review of Systems   Constitutional: Positive for fatigue. Negative for fever and unexpected weight change. HENT: Negative. Eyes: Negative. Respiratory: Negative for cough and shortness of breath. Cardiovascular: Negative for chest pain and leg swelling. Gastrointestinal: Positive for constipation. Negative for abdominal pain, blood in stool and diarrhea. Endocrine: Negative. Genitourinary: Negative for frequency and urgency. Musculoskeletal: Negative. Skin: Negative. Allergic/Immunologic: Negative. Neurological: Positive for weakness and headaches. Negative for dizziness. Hematological: Negative. Psychiatric/Behavioral: Positive for dysphoric mood and sleep disturbance. The patient is nervous/anxious.          Chronic sad mood, anxiety over home situation       PAST MEDICAL HISTORY    Past Medical History:   Diagnosis Date    Abdominal pain     LLQ    Allergy     Anemia     Iron deficiency    Anxiety     Bronchitis     Depression     Diverticulitis     Dizziness     Fracture     Left pubic    Gait disturbance     Genital herpes     GERD (gastroesophageal reflux disease)     Headache     History of ear infections     Hyperlipidemia     Hypertension     Multiple sclerosis (HCC)     Osteoarthritis     Osteoporosis     Parkinsons (HCC)     Pneumonia     Swallowing impairment     Vision disturbance        FAMILY HISTORY    Family History   Problem Relation Age of Onset    Cancer Mother     Glaucoma Mother     Other Mother 50        peritonitis    Diabetes Father     High Blood Pressure Father     Heart Disease Father     Ulcerative Colitis Father     Stroke Father        SOCIAL HISTORY    Social History     Socioeconomic History    Marital status:      Spouse name: None    Number of children: None    Years of education: None    Highest education level: None   Occupational History    None   Social Needs    Financial resource strain: migrainosus, not intractable  Chronic. Pt agrees to take no more than 5 daily and knows I will only fill it every 20 days for 100 pills. Discussed continuing antidepressant per neurologist.   - butalbital-acetaminophen-caffeine (FIORICET, ESGIC) -40 MG per tablet; Take 1 tablet by mouth every 4-6 hours as needed for Headaches Max 5 daily  Dispense: 100 tablet; Refill: 0    2. Essential hypertension  Chronic, stable, continue current medication and/or plan      3. Medication monitoring encounter  Discussed that acetominophen can cause liver damage and needs to be monitored. - Hepatic Function Panel; Future    4. Mixed hyperlipidemia  Chronic, stable, continue current medication and/or plan    - Lipid Panel; Future    5. Chronic fatigue  Chronic. Cont to try and follow healthy habits. 6. Multiple sclerosis ([facility] [address]  Cont seeing neurologist. Gentle activity     7. At high risk for falls  Discussed staying safe, moving slowly. Claudetta Ing received counseling on the following healthy behaviors: nutrition, exercise and medication adherence  Reviewed prior labs and health maintenance. Continue current medications, diet and exercise. Discussed use, benefit, and side effects of prescribed medications. Barriers to medication compliance addressed. Patient given educational materials - see patient instructions. All patient questions answered. Patient voiced understanding. Return in about 3 months (around 8/21/2020), or headache. Electronically signed by Fe Oneal MD on 5/21/20 at 1:08 PM EDT  On the basis of positive falls risk screening, assessment and plan is as follows: home safety tips provided.

## 2020-05-22 LAB
ALBUMIN SERPL-MCNC: 4.6 G/DL (ref 3.5–5.2)
ALBUMIN/GLOBULIN RATIO: 2.1 (ref 1–2.5)
ALP BLD-CCNC: 97 U/L (ref 35–104)
ALT SERPL-CCNC: 12 U/L (ref 5–33)
AST SERPL-CCNC: 16 U/L
BILIRUB SERPL-MCNC: 0.2 MG/DL (ref 0.3–1.2)
BILIRUBIN DIRECT: <0.08 MG/DL
BILIRUBIN, INDIRECT: ABNORMAL MG/DL (ref 0–1)
CHOLESTEROL/HDL RATIO: 2.5
CHOLESTEROL: 214 MG/DL
GLOBULIN: ABNORMAL G/DL (ref 1.5–3.8)
HDLC SERPL-MCNC: 85 MG/DL
LDL CHOLESTEROL: 110 MG/DL (ref 0–130)
TOTAL PROTEIN: 6.8 G/DL (ref 6.4–8.3)
TRIGL SERPL-MCNC: 97 MG/DL
VLDLC SERPL CALC-MCNC: ABNORMAL MG/DL (ref 1–30)

## 2020-05-26 ENCOUNTER — CARE COORDINATION (OUTPATIENT)
Dept: CARE COORDINATION | Age: 85
End: 2020-05-26

## 2020-05-26 NOTE — CARE COORDINATION
Ambulatory Care Coordination Note  5/26/2020  CM Risk Score: 0  Charlson 10 Year Mortality Risk Score: 47%     ACC: Anthony Nelson, RN    Summary Note: spoke with pt who said she is doing a little better at this time her  has [facility] home care the  is to call them about getting her some extra assistance at home with her  we did talk about guardian brea she will wait to see what the sw from [facility] says first. She does have her stress test and echo set up for June 18 and 19, she denies any other needs at this time          Care Coordination Interventions    Program Enrollment:  Rising Risk  Referral from Primary Care Provider:  Yes  Suggested Interventions and Community Resources  Fall Risk Prevention: In Process         Goals Addressed                 This Visit's Progress     Reduce Falls    On track     I will reduce my risk of falls by the following: Remove rugs or use non slip rugs    Barriers: lack of support and overwhelmed by complexity of regimen  Plan for overcoming my barriers: N/A  Confidence: 8/10   Anticipated Goal Completion Date: 7/27/20            Prior to Admission medications    Medication Sig Start Date End Date Taking?  Authorizing Provider   butalbital-acetaminophen-caffeine (FIORICET, ESGIC) -46 MG per tablet Take 1 tablet by mouth every 4-6 hours as needed for Headaches Max 5 daily 5/21/20 6/10/20  Keena Hyde MD   atenolol (TENORMIN) 25 MG tablet Take 1 tablet by mouth daily 9/23/19   Keena Hyde MD   simvastatin (ZOCOR) 10 MG tablet Take 1 tablet by mouth nightly 9/23/19   Keena Hyde MD   ibandronate (BONIVA) 150 MG tablet Take 1 tablet by mouth every 30 days 1/4/19   Keena Hyde MD   valACYclovir (VALTREX) 500 MG tablet Take 1 tablet by mouth 2 times daily 1/4/19   Keena Hyde MD   doxepin [facility]) 25 MG capsule Take 2 capsules by mouth 2 times daily 1/4/19   Keena Hyde MD   Handicap Placard MISC by Does not apply route Expires 5 years from date of initiation 7/14/17   ANA PAULA Bernal - CNP   clindamycin (CLEOCIN) 300 MG capsule  1/19/17   Historical Provider, MD   Aspirin-Acetaminophen-Caffeine (EXCEDRIN EXTRA STRENGTH PO) Take by mouth    Historical Provider, MD       Future Appointments   Date Time Provider Shabnam Steward   8/19/2020 11:15 AM MD ana rosa [address] [facility]

## 2020-06-02 ENCOUNTER — CARE COORDINATION (OUTPATIENT)
Dept: CARE COORDINATION | Age: 85
End: 2020-06-02

## 2020-06-16 ENCOUNTER — CARE COORDINATION (OUTPATIENT)
Dept: CARE COORDINATION | Age: 85
End: 2020-06-16

## 2020-06-21 RX ORDER — BUTALBITAL, ACETAMINOPHEN AND CAFFEINE 50; 325; 40 MG/1; MG/1; MG/1
TABLET ORAL
Qty: 100 TABLET | Refills: 0 | Status: SHIPPED | OUTPATIENT
Start: 2020-06-21 | End: 2020-08-16

## 2020-06-25 ENCOUNTER — CARE COORDINATION (OUTPATIENT)
Dept: CARE COORDINATION | Age: 85
End: 2020-06-25

## 2020-06-29 ENCOUNTER — CARE COORDINATION (OUTPATIENT)
Dept: CARE COORDINATION | Age: 85
End: 2020-06-29

## 2020-06-29 RX ORDER — LOSARTAN POTASSIUM 25 MG/1
25 TABLET ORAL DAILY
COMMUNITY
Start: 2020-06-19 | End: 2022-07-19 | Stop reason: ALTCHOICE

## 2020-06-29 NOTE — CARE COORDINATION
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 She was given a losartan  to see if that helps if this does not help she may need intervention. She will follow up with Dr Joshua Montague in [address] [place] [address]. Care Coordination Interventions    Program Enrollment:  Rising Risk  Referral from Primary Care Provider:  Yes  Suggested Interventions and Community Resources  Fall Risk Prevention: In Process         Goals Addressed                 This Visit's Progress     Reduce Falls    On track     I will reduce my risk of falls by the following: Remove rugs or use non slip rugs    Barriers: lack of support and overwhelmed by complexity of regimen  Plan for overcoming my barriers: N/A  Confidence: 8/10   Anticipated Goal Completion Date: 7/27/20            Prior to Admission medications    Medication Sig Start Date End Date Taking?  Authorizing Provider   butalbital-acetaminophen-caffeine (FIORICET, ESGIC) -40 MG per tablet TAKE ONE TABLET BY MOUTH EVERY 4 TO 6 HOURS AS NEEDED FOR HEADACHES **MAX DAILY DOSE OF 5 TABLETS** 6/21/20   Luciano Caldera MD   atenolol (TENORMIN) 25 MG tablet Take 1 tablet by mouth daily 9/23/19   Luciano Caldera MD   simvastatin (ZOCOR) 10 MG tablet Take 1 tablet by mouth nightly 9/23/19   Luciano Caldera MD   ibandronate (BONIVA) 150 MG tablet Take 1 tablet by mouth every 30 days 1/4/19   Luciano Caldera MD   valACYclovir (VALTREX) 500 MG tablet Take 1 tablet by mouth 2 times daily 1/4/19   Luciano Caldera MD   doxepin [facility]) 25 MG capsule Take 2 capsules by mouth 2 times daily 1/4/19   Luciano Caldera MD   Handdonnie Heath MISC by Does not apply route Expires 5 years from date of initiation 7/14/17   ANA PAULA Heck - CNP   clindamycin (CLEOCIN) 300 MG capsule  1/19/17   Historical Provider, MD Aspirin-Acetaminophen-Caffeine (EXCEDRIN EXTRA STRENGTH PO) Take by mouth    Historical Provider, MD       Future Appointments   Date Time Provider Shabnam Steward   8/19/2020 11:15 AM MD ana rosa Unger [facility]   9/21/2020  1:45 PM MD [facility]

## 2020-07-10 ENCOUNTER — CARE COORDINATION (OUTPATIENT)
Dept: CARE COORDINATION | Age: 85
End: 2020-07-10

## 2020-07-10 NOTE — CARE COORDINATION
Ambulatory Care Coordination Note  7/10/2020  CM Risk Score: 0  Charlson 10 Year Mortality Risk Score: 47%     ACC: Francisco Fang, RN    Summary Note: spoke with pt who said she followed up with Dr Dolly Ashley who had her recheck her vitamin D and she had a chest xray. She is taking her losartan daily. She said her eye sight is getting worse. Her daughter contacted Dr Yvette Tuttle office and they are to call her for an apt. She did call around for someone to come help with her  but said she cannot afford to pay out of pocket. She does know about area office on aging and has had them out in the past. She said for now her daughter is coming over to help her as much as she can. Care Coordination Interventions    Program Enrollment:  Rising Risk  Referral from Primary Care Provider:  Yes  Suggested Interventions and Community Resources  Fall Risk Prevention: In Process         Goals Addressed                 This Visit's Progress     Reduce Falls    On track     I will reduce my risk of falls by the following: Remove rugs or use non slip rugs    Barriers: lack of support and overwhelmed by complexity of regimen  Plan for overcoming my barriers: N/A  Confidence: 8/10   Anticipated Goal Completion Date: 7/27/20            Prior to Admission medications    Medication Sig Start Date End Date Taking?  Authorizing Provider   losartan (COZAAR) 25 MG tablet Take 25 mg by mouth daily 6/19/20   Historical Provider, MD   butalbital-acetaminophen-caffeine (FIORICET, ESGIC) -40 MG per tablet TAKE ONE TABLET BY MOUTH EVERY 4 TO 6 HOURS AS NEEDED FOR HEADACHES **MAX DAILY DOSE OF 5 TABLETS** 6/21/20   Analilia Guerin MD   atenolol (TENORMIN) 25 MG tablet Take 1 tablet by mouth daily 9/23/19   Analilia Guerin MD   simvastatin (ZOCOR) 10 MG tablet Take 1 tablet by mouth nightly 9/23/19   Analilia Guerin MD   ibandronate (BONIVA) 150 MG tablet Take 1 tablet by mouth every 30 days 1/4/19   Analilia Guerin MD   valACYclovir (VALTREX) 500 MG tablet Take 1 tablet by mouth 2 times daily 1/4/19   Kevin Norman MD   doxepin [facility]) 25 MG capsule Take 2 capsules by mouth 2 times daily 1/4/19   Kevin Norman MD   Handdonnie Heath MISC by Does not apply route Expires 5 years from date of initiation 7/14/17   Vern Collins APRN - CNP   clindamycin (CLEOCIN) 300 MG capsule  1/19/17   Historical Provider, MD   Aspirin-Acetaminophen-Caffeine ([facility]) Take by mouth    Historical Provider, MD       Future Appointments   Date Time Provider Shabnam Steward   8/19/2020 11:15 AM MD ana rosa Hairston [facility]   9/21/2020  1:45 PM [facility]

## 2020-07-24 ENCOUNTER — CARE COORDINATION (OUTPATIENT)
Dept: CARE COORDINATION | Age: 85
End: 2020-07-24

## 2020-07-31 ENCOUNTER — CARE COORDINATION (OUTPATIENT)
Dept: CARE COORDINATION | Age: 85
End: 2020-07-31

## 2020-07-31 NOTE — CARE COORDINATION
Ambulatory Care Coordination Note  7/31/2020  CM Risk Score: 0  Charlson 10 Year Mortality Risk Score: 47%     ACC: Shyrl Severin, RN    Summary Note: spoke with pt who said she is doing ok at home she is going to see her cardiologist in [address]. He is going to see about getting cardiac clearance for her back ablation at that apt. She said things are the same with her . Care Coordination Interventions    Program Enrollment:  Rising Risk  Referral from Primary Care Provider:  Yes  Suggested Interventions and Community Resources  Fall Risk Prevention: In Process         Goals Addressed                 This Visit's Progress     Reduce Falls    On track     I will reduce my risk of falls by the following: Remove rugs or use non slip rugs    Barriers: lack of support and overwhelmed by complexity of regimen  Plan for overcoming my barriers: N/A  Confidence: 8/10   Anticipated Goal Completion Date: 7/27/20            Prior to Admission medications    Medication Sig Start Date End Date Taking?  Authorizing Provider   losartan (COZAAR) 25 MG tablet Take 25 mg by mouth daily 6/19/20   Historical Provider, MD   butalbital-acetaminophen-caffeine (FIORICET, ESGIC) -40 MG per tablet TAKE ONE TABLET BY MOUTH EVERY 4 TO 6 HOURS AS NEEDED FOR HEADACHES **MAX DAILY DOSE OF 5 TABLETS** 6/21/20   Ana M Greenberg MD   atenolol (TENORMIN) 25 MG tablet Take 1 tablet by mouth daily 9/23/19   Ana M Greenberg MD   simvastatin (ZOCOR) 10 MG tablet Take 1 tablet by mouth nightly 9/23/19   Ana M Greenberg MD   ibandronate (BONIVA) 150 MG tablet Take 1 tablet by mouth every 30 days 1/4/19   Ana M Greenberg MD   valACYclovir (VALTREX) 500 MG tablet Take 1 tablet by mouth 2 times daily 1/4/19   Ana M Greenberg MD   [facility]) 25 MG capsule Take 2 capsules by mouth 2 times daily 1/4/19   Ana M Greenberg MD   Handicap Placard MISC by Does not apply route Expires 5 years from date of initiation 7/14/17   Ada Alaniz APRN - CNP   clindamycin (CLEOCIN) 300 MG capsule  1/19/17   Historical Provider, MD   Aspirin-Acetaminophen-Caffeine (EXCEDRIN EXTRA STRENGTH PO) Take by mouth    Historical Provider, MD       Future Appointments   Date Time Provider Shabnam Steward   8/19/2020 11:15 AM MD ana rosa Menjivar [facility]   9/21/2020  1:45 PM [facility]

## 2020-08-14 ENCOUNTER — CARE COORDINATION (OUTPATIENT)
Dept: CARE COORDINATION | Age: 85
End: 2020-08-14

## 2020-08-14 NOTE — CARE COORDINATION
Ambulatory Care Coordination Note  8/14/2020  CM Risk Score: 0  Charlson 10 Year Mortality Risk Score: 47%     ACC: Denisa Michaels, RN    Summary Note: spoke with pt who said she is doing ok. She is still struggling with her  at home. He does have a nurse coming out. She will still see cardiology in sept and will see her pcp next week. She denies any needs at this time. Care Coordination Interventions    Program Enrollment:  Rising Risk  Referral from Primary Care Provider:  Yes  Suggested Interventions and Community Resources  Fall Risk Prevention: In Process         Goals Addressed                 This Visit's Progress     Reduce Falls    On track     I will reduce my risk of falls by the following: Remove rugs or use non slip rugs    Barriers: lack of support and overwhelmed by complexity of regimen  Plan for overcoming my barriers: N/A  Confidence: 8/10   Anticipated Goal Completion Date: 7/27/20            Prior to Admission medications    Medication Sig Start Date End Date Taking?  Authorizing Provider   losartan (COZAAR) 25 MG tablet Take 25 mg by mouth daily 6/19/20   Historical Provider, MD   butalbital-acetaminophen-caffeine (FIORICET, ESGIC) -40 MG per tablet TAKE ONE TABLET BY MOUTH EVERY 4 TO 6 HOURS AS NEEDED FOR HEADACHES **MAX DAILY DOSE OF 5 TABLETS** 6/21/20   Maisha Charles MD   atenolol (TENORMIN) 25 MG tablet Take 1 tablet by mouth daily 9/23/19   Maisha Charles MD   simvastatin (ZOCOR) 10 MG tablet Take 1 tablet by mouth nightly 9/23/19   Maisha Charles MD   ibandronate (BONIVA) 150 MG tablet Take 1 tablet by mouth every 30 days 1/4/19   Maisha Charles MD   valACYclovir (VALTREX) 500 MG tablet Take 1 tablet by mouth 2 times daily 1/4/19   Maisha Charles MD   [facility] 25 MG capsule Take 2 capsules by mouth 2 times daily 1/4/19   Maisha Charles MD   Handicap Placard MISC by Does not apply route Expires 5 years from date of initiation 7/14/17   Nelson Dorado APRN - CNP   clindamycin (CLEOCIN) 300 MG capsule  1/19/17   Historical Provider, MD   Aspirin-Acetaminophen-Caffeine (EXCEDRIN EXTRA STRENGTH PO) Take by mouth    Historical Provider, MD       Future Appointments   Date Time Provider Shabnam Nichelle   8/19/2020 11:15 AM MD ana rosa Egan [facility]   9/21/2020  1:45 PM MD [facility]

## 2020-08-16 RX ORDER — BUTALBITAL, ACETAMINOPHEN AND CAFFEINE 50; 325; 40 MG/1; MG/1; MG/1
TABLET ORAL
Qty: 100 TABLET | Refills: 0 | Status: SHIPPED | OUTPATIENT
Start: 2020-08-16 | End: 2020-09-14

## 2020-08-27 ENCOUNTER — CARE COORDINATION (OUTPATIENT)
Dept: CARE COORDINATION | Age: 85
End: 2020-08-27

## 2020-08-27 NOTE — CARE COORDINATION
Ambulatory Care Coordination Note  8/27/2020  CM Risk Score: 0  Charlson 10 Year Mortality Risk Score: 47%     ACC: Balbir Francisco RN    Summary Note: spoke with pt who said her  was in the hospital and now is at [facility] he is to come home this weekend. She said [facility] is setting up some assistance for them when he comes home. She did miss her apt with her pcp she thought it was the next day. She said she will see her cardiologist next Thursday. She will see her pcp in sept. Care Coordination Interventions    Program Enrollment:  Rising Risk  Referral from Primary Care Provider:  Yes  Suggested Interventions and Community Resources  Fall Risk Prevention: In Process         Goals Addressed                 This Visit's Progress     Reduce Falls    On track     I will reduce my risk of falls by the following: Remove rugs or use non slip rugs    Barriers: lack of support and overwhelmed by complexity of regimen  Plan for overcoming my barriers: N/A  Confidence: 8/10   Anticipated Goal Completion Date: 7/27/20            Prior to Admission medications    Medication Sig Start Date End Date Taking?  Authorizing Provider   butalbital-acetaminophen-caffeine (FIORICET, ESGIC) -40 MG per tablet TAKE ONE TABLET BY MOUTH EVERY 4 TO 6 HOURS AS NEEDED FOR HEADACHES *MAXIMUM DAILY DOSE OF 5 TABLETS* 8/16/20   Teofilo Nagel MD   losartan (COZAAR) 25 MG tablet Take 25 mg by mouth daily 6/19/20   Historical Provider, MD   atenolol (TENORMIN) 25 MG tablet Take 1 tablet by mouth daily 9/23/19   Teofilo Nagel MD   simvastatin (ZOCOR) 10 MG tablet Take 1 tablet by mouth nightly 9/23/19   Teofilo Nagel MD   ibandronate (BONIVA) 150 MG tablet Take 1 tablet by mouth every 30 days 1/4/19   Teofilo Nagel MD   valACYclovir (VALTREX) 500 MG tablet Take 1 tablet by mouth 2 times daily 1/4/19   Teofilo Nagel MD   doxepin [facility]) 25 MG capsule Take 2 capsules by mouth 2 times daily 1/4/19   Teofilo Nagel MD Handicap Placard MISC by Does not apply route Expires 5 years from date of initiation 7/14/17   ANA PAULA Moreno - CNP   clindamycin (CLEOCIN) 300 MG capsule  1/19/17   Historical Provider, MD   Aspirin-Acetaminophen-Caffeine (EXCEDRIN EXTRA STRENGTH PO) Take by mouth    Historical Provider, MD       Future Appointments   Date Time Provider Shabnam Steward   9/21/2020  1:45 PM MD ana rosa Harvey fp [facility]

## 2020-09-04 ENCOUNTER — CARE COORDINATION (OUTPATIENT)
Dept: CARE COORDINATION | Age: 85
End: 2020-09-04

## 2020-09-11 ENCOUNTER — CARE COORDINATION (OUTPATIENT)
Dept: CARE COORDINATION | Age: 85
End: 2020-09-11

## 2020-09-14 RX ORDER — BUTALBITAL, ACETAMINOPHEN AND CAFFEINE 50; 325; 40 MG/1; MG/1; MG/1
TABLET ORAL
Qty: 100 TABLET | Refills: 0 | Status: SHIPPED | OUTPATIENT
Start: 2020-09-14 | End: 2020-10-12

## 2020-09-18 ENCOUNTER — CARE COORDINATION (OUTPATIENT)
Dept: CARE COORDINATION | Age: 85
End: 2020-09-18

## 2020-09-25 ENCOUNTER — CARE COORDINATION (OUTPATIENT)
Dept: CARE COORDINATION | Age: 85
End: 2020-09-25

## 2020-09-25 NOTE — CARE COORDINATION
every 30 days 1/4/19   Micheal Santiago MD   valACYclovir (VALTREX) 500 MG tablet Take 1 tablet by mouth 2 times daily 1/4/19   Micheal Santiago MD   doxepin [facility]) 25 MG capsule Take 2 capsules by mouth 2 times daily 1/4/19   Micheal Santiago MD   Handicap Placard MISC by Does not apply route Expires 5 years from date of initiation 7/14/17   Primus Carrillo, APRN - CNP   clindamycin (CLEOCIN) 300 MG capsule  1/19/17   Historical Provider, MD   Aspirin-Acetaminophen-Caffeine ([place]) Take by mouth    Historical Provider, MD       Future Appointments   Date Time Provider Shabnam Steward   10/16/2020  2:00 PM MD ana rosa [address] fp [facility]

## 2020-10-12 RX ORDER — BUTALBITAL, ACETAMINOPHEN AND CAFFEINE 50; 325; 40 MG/1; MG/1; MG/1
TABLET ORAL
Qty: 100 TABLET | Refills: 0 | Status: SHIPPED | OUTPATIENT
Start: 2020-10-12 | End: 2020-11-09

## 2020-10-12 NOTE — TELEPHONE ENCOUNTER
Last visit: 5/21/2020  Last Med refill: 9/14/2020  Does patient have enough medication for 72 hours: Yes    Next Visit Date:  Future Appointments   Date Time Provider Shabnam Nichelle   10/16/2020  2:00 PM MD ana rosa [address] Maintenance   Topic Date Due    Shingles Vaccine (2 of 3) 02/26/2015    Annual Wellness Visit (AWV)  05/29/2019    Potassium monitoring  01/07/2020    Creatinine monitoring  01/07/2020    Flu vaccine (1) 09/01/2020    Lipid screen  05/21/2021    DTaP/Tdap/Td vaccine (3 - Td) 02/10/2029    DEXA (modify frequency per FRAX score)  Completed    Pneumococcal 65+ years Vaccine  Completed    Hepatitis A vaccine  Aged Out    Hepatitis B vaccine  Aged Out    Hib vaccine  Aged Out    Meningococcal (ACWY) vaccine  Aged Out       No results found for: LABA1C          ( goal A1C is < 7)   No results found for: LABMICR  LDL Cholesterol (mg/dL)   Date Value   05/21/2020 110     LDL Calculated (mg/dL)   Date Value   02/27/2017 90       (goal LDL is <100)   AST (U/L)   Date Value   05/21/2020 16     ALT (U/L)   Date Value   05/21/2020 12     BUN (mg/dL)   Date Value   01/31/2018 17     BP Readings from Last 3 Encounters:   05/21/20 134/76   04/27/20 (!) 180/83   09/23/19 138/80          (goal 120/80)    All Future Testing planned in [facility]  Lab Frequency Next Occurrence               Patient Active Problem List:     Chronic fatigue     Essential hypertension     Mixed hyperlipidemia     Primary insomnia     Multiple sclerosis (HCC)     Chronic migraine without aura without status migrainosus, not intractable     Osteoarthritis     Age-related osteoporosis with current pathological fracture     Diverticulitis     Anemia     Depression     Anxiety     Herpes     Major depressive disorder, recurrent, in full remission ([facility] [address]

## 2020-10-16 ENCOUNTER — OFFICE VISIT (OUTPATIENT)
Dept: FAMILY MEDICINE CLINIC | Age: 85
End: 2020-10-16
Payer: MEDICARE

## 2020-10-16 VITALS
DIASTOLIC BLOOD PRESSURE: 76 MMHG | HEART RATE: 100 BPM | TEMPERATURE: 97 F | SYSTOLIC BLOOD PRESSURE: 119 MMHG | BODY MASS INDEX: 20.72 KG/M2 | HEIGHT: 59 IN | WEIGHT: 102.8 LBS | OXYGEN SATURATION: 98 %

## 2020-10-16 PROCEDURE — G8482 FLU IMMUNIZE ORDER/ADMIN: HCPCS | Performed by: FAMILY MEDICINE

## 2020-10-16 PROCEDURE — G0438 PPPS, INITIAL VISIT: HCPCS | Performed by: FAMILY MEDICINE

## 2020-10-16 PROCEDURE — 4040F PNEUMOC VAC/ADMIN/RCVD: CPT | Performed by: FAMILY MEDICINE

## 2020-10-16 PROCEDURE — 90653 IIV ADJUVANT VACCINE IM: CPT | Performed by: FAMILY MEDICINE

## 2020-10-16 PROCEDURE — G0008 ADMIN INFLUENZA VIRUS VAC: HCPCS | Performed by: FAMILY MEDICINE

## 2020-10-16 PROCEDURE — 1123F ACP DISCUSS/DSCN MKR DOCD: CPT | Performed by: FAMILY MEDICINE

## 2020-10-16 ASSESSMENT — PATIENT HEALTH QUESTIONNAIRE - PHQ9
1. LITTLE INTEREST OR PLEASURE IN DOING THINGS: 1
SUM OF ALL RESPONSES TO PHQ QUESTIONS 1-9: 1
SUM OF ALL RESPONSES TO PHQ QUESTIONS 1-9: 1
2. FEELING DOWN, DEPRESSED OR HOPELESS: 0
SUM OF ALL RESPONSES TO PHQ9 QUESTIONS 1 & 2: 1
SUM OF ALL RESPONSES TO PHQ QUESTIONS 1-9: 1

## 2020-10-16 ASSESSMENT — LIFESTYLE VARIABLES
HOW OFTEN DO YOU HAVE A DRINK CONTAINING ALCOHOL: 0
HAVE YOU OR SOMEONE ELSE BEEN INJURED AS A RESULT OF YOUR DRINKING: 0
HOW MANY STANDARD DRINKS CONTAINING ALCOHOL DO YOU HAVE ON A TYPICAL DAY: 0
HOW OFTEN DURING THE LAST YEAR HAVE YOU FOUND THAT YOU WERE NOT ABLE TO STOP DRINKING ONCE YOU HAD STARTED: 0
HOW OFTEN DO YOU HAVE SIX OR MORE DRINKS ON ONE OCCASION: 0
HAS A RELATIVE, FRIEND, DOCTOR, OR ANOTHER HEALTH PROFESSIONAL EXPRESSED CONCERN ABOUT YOUR DRINKING OR SUGGESTED YOU CUT DOWN: 0
AUDIT TOTAL SCORE: 0
HOW OFTEN DURING THE LAST YEAR HAVE YOU NEEDED AN ALCOHOLIC DRINK FIRST THING IN THE MORNING TO GET YOURSELF GOING AFTER A NIGHT OF HEAVY DRINKING: 0
HOW OFTEN DURING THE LAST YEAR HAVE YOU FAILED TO DO WHAT WAS NORMALLY EXPECTED FROM YOU BECAUSE OF DRINKING: 0
HOW OFTEN DURING THE LAST YEAR HAVE YOU BEEN UNABLE TO REMEMBER WHAT HAPPENED THE NIGHT BEFORE BECAUSE YOU HAD BEEN DRINKING: 0
AUDIT-C TOTAL SCORE: 0
HOW OFTEN DURING THE LAST YEAR HAVE YOU HAD A FEELING OF GUILT OR REMORSE AFTER DRINKING: 0

## 2020-10-16 NOTE — PROGRESS NOTES
Medicare Annual Wellness Visit  Name: Sylvia Santiago Date: 10/16/2020   MRN: [id number] Sex: Female   Age: 82 y.o. Ethnicity: Non-/Non    : 1935 Race: Rosanne Harrington is here for Medicare AWV  AMW. Living with her  who has dementia and is declining. Seeing neurologist for hx of MS. Seeing cardiologist for his of aortic and pulmonary valve disorder. Taking medications as prescribed with no side effects and good effectiveness. Chronic headaches, has reduced fioricet as requested by me to 4 daily. Screenings for behavioral, psychosocial and functional/safety risks, and cognitive dysfunction are all negative except as indicated below. These results, as well as other patient data from the [address] form, are documented in Flowsheets linked to this Encounter. Allergies   Allergen Reactions    Codeine     Levaquin [Levofloxacin In D5w]     Morphine     Novocain [Procaine]     Penicillins     Percocet [Oxycodone-Acetaminophen]     Sulfa Antibiotics     Vicodin [Hydrocodone-Acetaminophen]          Prior to Visit Medications    Medication Sig Taking?  Authorizing Provider   butalbital-acetaminophen-caffeine (FIORICET, ESGIC) -40 MG per tablet TAKE ONE TABLET BY MOUTH EVERY 4 TO 6 HOURS AS NEEDED FOR HEADACHES *MAX OF 5 TABLETS PER DAY* Yes Stephane Esparza MD   losartan (COZAAR) 25 MG tablet Take 25 mg by mouth daily Yes Historical Provider, MD   atenolol (TENORMIN) 25 MG tablet Take 1 tablet by mouth daily Yes Stephane Esparza MD   simvastatin (ZOCOR) 10 MG tablet Take 1 tablet by mouth nightly Yes Stephane Esparza MD   ibandronate (BONIVA) 150 MG tablet Take 1 tablet by mouth every 30 days Yes Stephane Espazra MD   valACYclovir (VALTREX) 500 MG tablet Take 1 tablet by mouth 2 times daily Yes Stephane Esparza MD   doxepin (SINEQUAN) 25 MG capsule Take 2 capsules by mouth 2 times daily Yes Stephane Esparza MD   Handicap Placard MISC by Does not apply route Expires 5 years from date of initiation Yes ANA PAULA Nails - CNP   clindamycin (CLEOCIN) 300 MG capsule  Yes Historical Provider, MD   Aspirin-Acetaminophen-Caffeine ([place]han PO) Take by mouth Yes Historical Provider, MD         Past Medical History:   Diagnosis Date    Allergy     Anemia     Iron deficiency    Anxiety     Depression     Diverticulitis     Dizziness     Fracture     Left pubic    Gait disturbance     Genital herpes     GERD (gastroesophageal reflux disease)     Headache     Hyperlipidemia     Hypertension     Multiple sclerosis ([address]     Osteoarthritis     Osteoporosis     Parkinsons ([address]     Swallowing impairment     Vision disturbance        Past Surgical History:   Procedure Laterality Date    APPENDECTOMY      COLONOSCOPY      EYE SURGERY      cataract    HERNIA REPAIR      HYSTERECTOMY      JOINT REPLACEMENT  2007    left shoulder    REVISION SHOULDER ARTHROPLASTY  2017    reverse shoulder, Dr. Regis Morris           Family History   Problem Relation Age of Onset    Cancer Mother     Glaucoma Mother     Other Mother 50        peritonitis    Diabetes Father     High Blood Pressure Father     Heart Disease Father     Ulcerative Colitis Father     Stroke Father        CareTeam (Including outside providers/suppliers regularly involved in providing care):   Patient Care Team:  Enedina Boo MD as PCP - General (Family Medicine)  Enedina Boo MD as PCP - [facility] Empaneled Provider    Wt Readings from Last 3 Encounters:   10/16/20 102 lb 12.8 oz (46.6 kg)   05/21/20 101 lb (45.8 kg)   04/27/20 95 lb (43.1 kg)     Vitals:    10/16/20 1352   BP: 119/76   Site: Left Upper Arm   Position: Sitting   Cuff Size: Medium Adult   Pulse: 100   Temp: 97 °F (36.1 °C)   TempSrc: Temporal   SpO2: 98%   Weight: 102 lb 12.8 oz (46.6 kg)   Height: 4' 11\" (1.499 m) Body mass index is 20.76 kg/m². Based upon direct observation of the patient, evaluation of cognition reveals recent and remote memory intact. Physical Exam  Vitals signs reviewed. Constitutional:       Appearance: She is well-developed and normal weight. Comments: Frail appearance     HENT:      Head: Normocephalic. Right Ear: Tympanic membrane and ear canal normal.      Left Ear: Tympanic membrane and ear canal normal.   Eyes:      Conjunctiva/sclera: Conjunctivae normal.      Pupils: Pupils are equal, round, and reactive to light. Neck:      Musculoskeletal: Normal range of motion and neck supple. Thyroid: No thyromegaly. Cardiovascular:      Rate and Rhythm: Normal rate and regular rhythm. Heart sounds: Normal heart sounds. No murmur. Pulmonary:      Effort: Pulmonary effort is normal.      Breath sounds: Normal breath sounds. No wheezing or rales. Abdominal:      Palpations: Abdomen is soft. Tenderness: There is no abdominal tenderness. There is no guarding or rebound. Musculoskeletal: Normal range of motion. General: No tenderness or deformity. Lymphadenopathy:      Cervical: No cervical adenopathy. Skin:     General: Skin is warm and dry. Neurological:      Mental Status: She is alert and oriented to person, place, and time. Psychiatric:         Mood and Affect: Mood normal.         Behavior: Behavior normal.         Thought Content: Thought content normal.         Judgment: Judgment normal.           Patient's complete Health Risk Assessment and screening values have been reviewed and are found in Flowsheets. The following problems were reviewed today and where indicated follow up appointments were made and/or referrals ordered. Positive Risk Factor Screenings with Interventions:       General Health and ACP:  General  In general, how would you say your health is?: Fair  In the past 7 days, have you experienced any of the following?  New or evaluation/treatment for this issue  · family helps some    Personalized Preventive Plan   Current Health Maintenance Status  Immunization History   Administered Date(s) Administered    Influenza Vaccine, unspecified formulation 10/17/2013, 09/19/2014, 10/12/2015    Influenza Virus Vaccine 10/17/2013    Influenza, High Dose (Fluzone 65 yrs and older) 12/03/2012, 09/19/2014, 10/12/2015, 09/21/2017, 10/01/2018    Influenza, Triv, inactivated, subunit, adjuvanted, IM (Fluad 65 yrs and older) 09/23/2019    Pneumococcal Conjugate 13-valent ([id number]) 10/12/2015    Pneumococcal Polysaccharide ([id number]) 10/17/2013    Td vaccine (adult) 03/02/2005    Td, unspecified formulation 03/02/2005    Tdap (Boostrix, Adacel) 03/21/2016, 02/10/2019    Zoster Live (Zostavax) 01/01/2015        Health Maintenance   Topic Date Due    Shingles Vaccine (2 of 3) 02/26/2015    Annual Wellness Visit (AWV)  05/29/2019    Potassium monitoring  01/07/2020    Creatinine monitoring  01/07/2020    Flu vaccine (1) 09/01/2020    Lipid screen  05/21/2021    DTaP/Tdap/Td vaccine (3 - Td) 02/10/2029    DEXA (modify frequency per FRAX score)  Completed    Pneumococcal 65+ years Vaccine  Completed    Hepatitis A vaccine  Aged Out    Hepatitis B vaccine  Aged Out    Hib vaccine  Aged Out    Meningococcal (ACWY) vaccine  Aged Out     Recommendations for [employer] Due: see orders and patient instructions/AVS.  . Recommended screening schedule for the next 5-10 years is provided to the patient in written form: see Patient Bill Wills was seen today for medicare awv.     Diagnoses and all orders for this visit:    Routine general medical examination at a health care facility    Need for influenza vaccination  -     INFLUENZA, TRIV, INACTIVATED, SUBUNIT, ADJUVANTED, 65 YRS AND OLDER, IM, PREFILL SYR, 0.5ML (FLUAD TRIV)    Essential hypertension

## 2020-10-16 NOTE — PROGRESS NOTES
Vaccine Information Sheet, \"Influenza - Inactivated\"  given to Mary Taylor, or parent/legal guardian of  Mary Taylor and verbalized understanding. Patient responses:    Have you ever had a reaction to a flu vaccine? No  Do you have any current illness? No  Have you ever had Guillian [place] Syndrome? No  Do you have a serious allergy to any of the following: Neomycin, Polymyxin, Thimerosal, eggs or egg products? No    Flu vaccine given per order. Please see immunization tab. Risks and benefits explained. Current VIS given.

## 2020-11-09 RX ORDER — BUTALBITAL, ACETAMINOPHEN AND CAFFEINE 50; 325; 40 MG/1; MG/1; MG/1
TABLET ORAL
Qty: 100 TABLET | Refills: 0 | Status: SHIPPED | OUTPATIENT
Start: 2020-11-09 | End: 2020-12-07

## 2020-11-09 NOTE — TELEPHONE ENCOUNTER
Last visit: 10/16/2020  Last Med refill: 10/12/2020  Does patient have enough medication for 72 hours: No:     Next Visit Date:  Future Appointments   Date Time Provider Shabnam Nichelle   4/16/2021  2:00 PM MD ana [address] [id number]   10/22/2021  2:00 PM MD ana rosa [address] Maintenance   Topic Date Due    Shingles Vaccine (2 of 3) 02/26/2015    Potassium monitoring  01/07/2020    Creatinine monitoring  01/07/2020    Lipid screen  05/21/2021    Annual Wellness Visit (AWV)  10/17/2021    DTaP/Tdap/Td vaccine (3 - Td) 02/10/2029    DEXA (modify frequency per FRAX score)  Completed    Flu vaccine  Completed    Pneumococcal 65+ years Vaccine  Completed    Hepatitis A vaccine  Aged Out    Hepatitis B vaccine  Aged Out    Hib vaccine  Aged Out    Meningococcal (ACWY) vaccine  Aged Out       No results found for: LABA1C          ( goal A1C is < 7)   No results found for: LABMICR  LDL Cholesterol (mg/dL)   Date Value   05/21/2020 110     LDL Calculated (mg/dL)   Date Value   02/27/2017 90       (goal LDL is <100)   AST (U/L)   Date Value   05/21/2020 16     ALT (U/L)   Date Value   05/21/2020 12     BUN (mg/dL)   Date Value   01/31/2018 17     BP Readings from Last 3 Encounters:   10/16/20 119/76   05/21/20 134/76   04/27/20 (!) 180/83          (goal 120/80)    All Future Testing planned in [facility]  Lab Frequency Next Occurrence               Patient Active Problem List:     Chronic fatigue     Essential hypertension     Mixed hyperlipidemia     Primary insomnia     Multiple sclerosis (HCC)     Chronic migraine without aura without status migrainosus, not intractable     Osteoarthritis     Age-related osteoporosis with current pathological fracture     Diverticulitis     Anemia     Depression     Anxiety     Herpes     Major depressive disorder, recurrent, in full remission ([facility] [address]

## 2020-12-07 RX ORDER — BUTALBITAL, ACETAMINOPHEN AND CAFFEINE 50; 325; 40 MG/1; MG/1; MG/1
TABLET ORAL
Qty: 100 TABLET | Refills: 0 | Status: SHIPPED | OUTPATIENT
Start: 2020-12-07 | End: 2021-01-04

## 2021-01-04 DIAGNOSIS — G43.709 CHRONIC MIGRAINE WITHOUT AURA WITHOUT STATUS MIGRAINOSUS, NOT INTRACTABLE: ICD-10-CM

## 2021-01-04 RX ORDER — BUTALBITAL, ACETAMINOPHEN AND CAFFEINE 50; 325; 40 MG/1; MG/1; MG/1
TABLET ORAL
Qty: 100 TABLET | Refills: 0 | Status: SHIPPED
Start: 2021-01-04 | End: 2021-01-28 | Stop reason: ALTCHOICE

## 2021-01-28 DIAGNOSIS — G43.709 CHRONIC MIGRAINE WITHOUT AURA WITHOUT STATUS MIGRAINOSUS, NOT INTRACTABLE: Primary | ICD-10-CM

## 2021-01-28 RX ORDER — BUTALBITAL, ACETAMINOPHEN AND CAFFEINE 50; 325; 40 MG/1; MG/1; MG/1
1 TABLET ORAL EVERY 4 HOURS PRN
Qty: 180 TABLET | Refills: 3 | Status: SHIPPED | OUTPATIENT
Start: 2021-01-28 | End: 2021-05-19

## 2021-01-28 NOTE — TELEPHONE ENCOUNTER
Patient's daughter [place]) is asking for a refill on Disha's Fioricet, but they are requesting brand name. Disha's  passed away today and she has a severe headache from crying. Her daughter Douglas Mckay gave her one of her husbands Fioricet and it's brand name and Sally Holloway feels it worked better than her generic medication.  Please advise

## 2021-02-12 ENCOUNTER — TELEPHONE (OUTPATIENT)
Dept: FAMILY MEDICINE CLINIC | Age: 86
End: 2021-02-12

## 2021-05-04 ENCOUNTER — OFFICE VISIT (OUTPATIENT)
Dept: FAMILY MEDICINE CLINIC | Age: 86
End: 2021-05-04
Payer: MEDICARE

## 2021-05-04 VITALS
DIASTOLIC BLOOD PRESSURE: 60 MMHG | BODY MASS INDEX: 21.17 KG/M2 | WEIGHT: 104.8 LBS | SYSTOLIC BLOOD PRESSURE: 120 MMHG | HEART RATE: 86 BPM | OXYGEN SATURATION: 98 %

## 2021-05-04 DIAGNOSIS — R60.0 BILATERAL LEG EDEMA: ICD-10-CM

## 2021-05-04 DIAGNOSIS — F33.42 MAJOR DEPRESSIVE DISORDER, RECURRENT, IN FULL REMISSION (HCC): ICD-10-CM

## 2021-05-04 DIAGNOSIS — E55.9 VITAMIN D DEFICIENCY: ICD-10-CM

## 2021-05-04 DIAGNOSIS — I10 ESSENTIAL HYPERTENSION: Primary | ICD-10-CM

## 2021-05-04 DIAGNOSIS — R13.12 OROPHARYNGEAL DYSPHAGIA: ICD-10-CM

## 2021-05-04 DIAGNOSIS — M80.00XS AGE-RELATED OSTEOPOROSIS WITH CURRENT PATHOLOGICAL FRACTURE, SEQUELA: ICD-10-CM

## 2021-05-04 DIAGNOSIS — B00.9 HERPES: ICD-10-CM

## 2021-05-04 DIAGNOSIS — R53.1 WEAKNESS GENERALIZED: ICD-10-CM

## 2021-05-04 DIAGNOSIS — M15.9 PRIMARY OSTEOARTHRITIS INVOLVING MULTIPLE JOINTS: ICD-10-CM

## 2021-05-04 DIAGNOSIS — R53.82 CHRONIC FATIGUE: ICD-10-CM

## 2021-05-04 DIAGNOSIS — E53.8 VITAMIN B 12 DEFICIENCY: ICD-10-CM

## 2021-05-04 DIAGNOSIS — G35 MULTIPLE SCLEROSIS (HCC): ICD-10-CM

## 2021-05-04 DIAGNOSIS — G43.709 CHRONIC MIGRAINE WITHOUT AURA WITHOUT STATUS MIGRAINOSUS, NOT INTRACTABLE: ICD-10-CM

## 2021-05-04 PROCEDURE — G8399 PT W/DXA RESULTS DOCUMENT: HCPCS | Performed by: FAMILY MEDICINE

## 2021-05-04 PROCEDURE — 1036F TOBACCO NON-USER: CPT | Performed by: FAMILY MEDICINE

## 2021-05-04 PROCEDURE — 1123F ACP DISCUSS/DSCN MKR DOCD: CPT | Performed by: FAMILY MEDICINE

## 2021-05-04 PROCEDURE — 1090F PRES/ABSN URINE INCON ASSESS: CPT | Performed by: FAMILY MEDICINE

## 2021-05-04 PROCEDURE — 4040F PNEUMOC VAC/ADMIN/RCVD: CPT | Performed by: FAMILY MEDICINE

## 2021-05-04 PROCEDURE — G8420 CALC BMI NORM PARAMETERS: HCPCS | Performed by: FAMILY MEDICINE

## 2021-05-04 PROCEDURE — G8427 DOCREV CUR MEDS BY ELIG CLIN: HCPCS | Performed by: FAMILY MEDICINE

## 2021-05-04 PROCEDURE — 99214 OFFICE O/P EST MOD 30 MIN: CPT | Performed by: FAMILY MEDICINE

## 2021-05-04 RX ORDER — IBANDRONATE SODIUM 150 MG/1
150 TABLET, FILM COATED ORAL
Qty: 3 TABLET | Refills: 3 | Status: SHIPPED | OUTPATIENT
Start: 2021-05-04 | End: 2022-07-19 | Stop reason: ALTCHOICE

## 2021-05-04 RX ORDER — VALACYCLOVIR HYDROCHLORIDE 500 MG/1
500 TABLET, FILM COATED ORAL 2 TIMES DAILY
Qty: 30 TABLET | Refills: 1 | Status: SHIPPED | OUTPATIENT
Start: 2021-05-04 | End: 2021-05-20 | Stop reason: SDUPTHER

## 2021-05-04 ASSESSMENT — ENCOUNTER SYMPTOMS
COUGH: 0
ABDOMINAL PAIN: 0
EYES NEGATIVE: 1
BLOOD IN STOOL: 0
CONSTIPATION: 0
ALLERGIC/IMMUNOLOGIC NEGATIVE: 1
SHORTNESS OF BREATH: 0
DIARRHEA: 0

## 2021-05-04 NOTE — PROGRESS NOTES
[facility]  [facility]  [address]  [facility] [address]  Dept: [phone]    5/4/2021    CHIEF COMPLAINT    Chief Complaint   Patient presents with    Headache    Hypertension       HPI    Lester Galan is a 82 y.o. female who presents   Chief Complaint   Patient presents with    Headache    Hypertension   . Here with her daughter for recheck of chronic conditions. She is living alone since her  passed away within the last 6 months. She does drive short distances. She has been to the [facility] for low vision and will be getting some new glasses to help accommodate her. History of MS and subsequent weakness. Not currently seeing a neurologist.  Having difficulty with swallowing she says she has to take a large cup of water before she puts the pill in her mouth and then has to lean to the left side because it feels like a pill will get stuck in the right side. She had swallow studies done when she was hospitalized 2 years ago after a traumatic fall. She had difficulty with thin liquids but was able to swallow well with thickened solutions. She has not continued with any diet modifications at home. History of chronic migraines taking medication as needed, some days no medicine needed another she takes 3 or 4-day. Taking medication for hypertension, hyperlipidemia, insomnia, osteoporosis, chronic herpes and headaches. No side effects from medications      Vitals:    05/04/21 1430   BP: 120/60   Pulse: 86   SpO2: 98%   Weight: 104 lb 12.8 oz (47.5 kg)       REVIEW OF SYSTEMS    Review of Systems   Constitutional: Positive for fatigue. Negative for fever and unexpected weight change. HENT: Negative. Eyes: Negative. Respiratory: Negative for cough and shortness of breath. Cardiovascular: Negative for chest pain and leg swelling. Gastrointestinal: Negative for abdominal pain, blood in stool, constipation and diarrhea. Dysphagia, feels like pills get stuck in the right side of her throat. Endocrine: Negative. Genitourinary: Negative for frequency and urgency. Musculoskeletal: Positive for arthralgias. Skin: Negative. Allergic/Immunologic: Negative. Neurological: Positive for weakness and headaches. Negative for dizziness. Hematological: Negative. Psychiatric/Behavioral: Positive for dysphoric mood. Negative for sleep disturbance. The patient is nervous/anxious. PAST MEDICAL HISTORY    Past Medical History:   Diagnosis Date    Allergy     Anemia     Iron deficiency    Anxiety     Depression     Diverticulitis     Dizziness     Fracture     Left pubic    Gait disturbance     Genital herpes     GERD (gastroesophageal reflux disease)     Headache     Hyperlipidemia     Hypertension     Multiple sclerosis ([address]     Osteoarthritis     Osteoporosis     Parkinsons (HCC)     Swallowing impairment     Vision disturbance        FAMILY HISTORY    Family History   Problem Relation Age of Onset    Cancer Mother     Glaucoma Mother     Other Mother 50        peritonitis    Diabetes Father     High Blood Pressure Father     Heart Disease Father     Ulcerative Colitis Father     Stroke Father        SOCIAL HISTORY    Social History     Socioeconomic History    Marital status:       Spouse name: None    Number of children: 2    Years of education: None    Highest education level: None   Occupational History    None   Social Needs    Financial resource strain: Not hard at all   [place] insecurity     Worry: Never true     Inability: Never true   [employer] needs     Medical: No     Non-medical: No   Tobacco Use    Smoking status: Former Smoker    Smokeless tobacco: Never Used    Tobacco comment: 40-50   Substance and Sexual Activity    Alcohol use: Yes     Frequency: Monthly or less     Drinks per session: 1 or 2     Binge frequency: Never     Comment: Socially    Drug use: No    Sexual activity: Not Currently     Partners: Male   Lifestyle    Physical activity     Days per week: 2 days     Minutes per session: 20 min    Stress: Rather much   Relationships    Social connections     Talks on phone: Twice a week     Gets together: Never     Attends [employer] service: 1 to 4 times per year     Active member of club or organization: No     Attends meetings of clubs or organizations: Never     Relationship status:     Intimate partner violence     Fear of current or ex partner: None     Emotionally abused: None     Physically abused: None     Forced sexual activity: None   Other Topics Concern    None   Social History Narrative    None       SURGICAL HISTORY    Past Surgical History:   Procedure Laterality Date    APPENDECTOMY      COLONOSCOPY      EYE SURGERY      cataract    HERNIA REPAIR      HYSTERECTOMY      JOINT REPLACEMENT  2007    left shoulder    REVISION SHOULDER ARTHROPLASTY  2017    [place] [facility]    Current Outpatient Medications   Medication Sig Dispense Refill    valACYclovir (VALTREX) 500 MG tablet Take 1 tablet by mouth 2 times daily 30 tablet 1    ibandronate (BONIVA) 150 MG tablet Take 1 tablet by mouth every 30 days 3 tablet 3    butalbital-acetaminophen-caffeine (FIORICET, ESGIC) -40 MG per tablet Take 1 tablet by mouth every 4 hours as needed for Headaches 180 tablet 3    losartan (COZAAR) 25 MG tablet Take 25 mg by mouth daily      simvastatin (ZOCOR) 10 MG tablet Take 1 tablet by mouth nightly 30 tablet 5    doxepin (SINEQUAN) 25 MG capsule Take 2 capsules by mouth 2 times daily 120 capsule 3    Handicap Placard MISC by Does not apply route Expires 5 years from date of initiation 1 each 0    clindamycin (CLEOCIN) 300 MG capsule       Aspirin-Acetaminophen-Caffeine (EXCEDRIN EXTRA STRENGTH PO) Take by mouth       No current facility-administered medications for this visit. ALLERGIES    Allergies   Allergen Reactions    Codeine     Levaquin [Levofloxacin In D5w]     Morphine     Novocain [Procaine]     Penicillins     Percocet [Oxycodone-Acetaminophen]     Sulfa Antibiotics     Vicodin [Hydrocodone-Acetaminophen]        PHYSICAL EXAM   Physical Exam  Vitals signs reviewed. Constitutional:       Appearance: She is well-developed. Comments: Frail-appearing   HENT:      Head: Normocephalic. Eyes:      Conjunctiva/sclera: Conjunctivae normal.      Pupils: Pupils are equal, round, and reactive to light. Neck:      Musculoskeletal: Normal range of motion and neck supple. Thyroid: No thyromegaly. Cardiovascular:      Rate and Rhythm: Normal rate and regular rhythm. Heart sounds: Normal heart sounds. No murmur. Pulmonary:      Effort: Pulmonary effort is normal.      Breath sounds: Normal breath sounds. No wheezing or rales. Abdominal:      Palpations: Abdomen is soft. Tenderness: There is no abdominal tenderness. There is no guarding or rebound. Musculoskeletal: Normal range of motion. General: Deformity (oa) present. No tenderness. Right lower le+ Edema present. Left lower le+ Edema present. Lymphadenopathy:      Cervical: No cervical adenopathy. Skin:     General: Skin is warm and dry. Neurological:      Mental Status: She is alert and oriented to person, place, and time. Motor: Weakness present. Gait: Gait abnormal.      Comments: Slow gait, mildly stooped   Psychiatric:         Behavior: Behavior normal.         Thought Content: Thought content normal.         Judgment: Judgment normal.      Comments: dysthymia         ASSESSMENT/PLAN  1. Essential hypertension  Chronic and stable on current medication.  - Comprehensive Metabolic Panel; Future    2.  Major depressive disorder, recurrent, in full remission ([facility] [address]  Stable on doxepin. Continue to try and socialize with family and friends within limitations of Covid. Encouraged to get Covid vaccine    3. Multiple sclerosis (HCC)  Chronic and unchanged. If symptoms are worsening refer back to neurology    4. Chronic migraine without aura without status migrainosus, not intractable  Continue as needed meds for headaches    5. Primary osteoarthritis involving multiple joints  Continue activity within limitations. 6. Oropharyngeal dysphagia  Refer to Dr. Kathy Solorzano  - External Referral To Gastroenterology    7. Weakness generalized  Check labs. Continue trying to follow healthy diet  - TSH with Reflex; Future  - CBC Auto Differential; Future  - Vitamin B12; Future  - Vitamin D 25 Hydroxy; Future    8. Chronic fatigue  Unchanged  - CBC Auto Differential; Future  - Vitamin B12; Future  - Vitamin D 25 Hydroxy; Future    9. Vitamin B 12 deficiency  Adjust supplement if indicated  - Vitamin B12; Future    10. Vitamin D deficiency  Adjust supplement if indicated  - Vitamin D 25 Hydroxy; Future    11. Age-related osteoporosis with current pathological fracture, sequela  Stable, continue Boniva  - ibandronate (BONIVA) 150 MG tablet; Take 1 tablet by mouth every 30 days  Dispense: 3 tablet; Refill: 3    12. Bilateral leg edema  Try to sit with legs elevated during the day if seated for a long time. 13. Herpes  Continue suppressive medication  - valACYclovir (VALTREX) 500 MG tablet; Take 1 tablet by mouth 2 times daily  Dispense: 30 tablet; Refill: 1       Disha received counseling on the following healthy behaviors: nutrition, exercise and medication adherence  Reviewed prior labs and health maintenance. Continue current medications, diet and exercise. Discussed use, benefit, and side effects of prescribed medications. Barriers to medication compliance addressed. Patient given educational materials - see patient instructions. All patient questions answered.   Patient voiced understanding. Return in about 6 months (around 11/4/2021) for fatigue, headaches.         Electronically signed by Dave Arias MD on 5/4/21 at 2:34 PM EDT

## 2021-05-19 DIAGNOSIS — G43.709 CHRONIC MIGRAINE WITHOUT AURA WITHOUT STATUS MIGRAINOSUS, NOT INTRACTABLE: ICD-10-CM

## 2021-05-19 RX ORDER — BUTALBITAL, ACETAMINOPHEN AND CAFFEINE 50; 325; 40 MG/1; MG/1; MG/1
TABLET ORAL
Qty: 180 TABLET | Refills: 2 | Status: SHIPPED | OUTPATIENT
Start: 2021-05-19 | End: 2021-08-19

## 2021-05-20 DIAGNOSIS — B00.9 HERPES: ICD-10-CM

## 2021-05-20 RX ORDER — VALACYCLOVIR HYDROCHLORIDE 500 MG/1
500 TABLET, FILM COATED ORAL 2 TIMES DAILY
Qty: 30 TABLET | Refills: 5 | Status: SHIPPED | OUTPATIENT
Start: 2021-05-20

## 2021-05-21 LAB
ALBUMIN SERPL-MCNC: NORMAL G/DL
ALP BLD-CCNC: NORMAL U/L
ALT SERPL-CCNC: NORMAL U/L
ANION GAP SERPL CALCULATED.3IONS-SCNC: NORMAL MMOL/L
AST SERPL-CCNC: NORMAL U/L
BASOPHILS ABSOLUTE: NORMAL
BASOPHILS RELATIVE PERCENT: NORMAL
BILIRUB SERPL-MCNC: NORMAL MG/DL
BUN BLDV-MCNC: NORMAL MG/DL
CALCIUM SERPL-MCNC: NORMAL MG/DL
CHLORIDE BLD-SCNC: NORMAL MMOL/L
CO2: NORMAL
CREAT SERPL-MCNC: NORMAL MG/DL
EOSINOPHILS ABSOLUTE: NORMAL
EOSINOPHILS RELATIVE PERCENT: NORMAL
GFR CALCULATED: NORMAL
GLUCOSE BLD-MCNC: NORMAL MG/DL
HCT VFR BLD CALC: NORMAL %
HEMOGLOBIN: NORMAL
LYMPHOCYTES ABSOLUTE: NORMAL
LYMPHOCYTES RELATIVE PERCENT: NORMAL
MCH RBC QN AUTO: NORMAL PG
MCHC RBC AUTO-ENTMCNC: NORMAL G/DL
MCV RBC AUTO: NORMAL FL
MONOCYTES ABSOLUTE: NORMAL
MONOCYTES RELATIVE PERCENT: NORMAL
NEUTROPHILS ABSOLUTE: NORMAL
NEUTROPHILS RELATIVE PERCENT: NORMAL
PDW BLD-RTO: NORMAL %
PLATELET # BLD: NORMAL 10*3/UL
PMV BLD AUTO: NORMAL FL
POTASSIUM SERPL-SCNC: NORMAL MMOL/L
RBC # BLD: NORMAL 10*6/UL
SODIUM BLD-SCNC: NORMAL MMOL/L
TOTAL PROTEIN: NORMAL
TSH SERPL DL<=0.05 MIU/L-ACNC: NORMAL M[IU]/L
VITAMIN B-12: NORMAL
VITAMIN D 25-HYDROXY: NORMAL
VITAMIN D2, 25 HYDROXY: NORMAL
VITAMIN D3,25 HYDROXY: NORMAL
WBC # BLD: NORMAL 10*3/UL

## 2021-05-25 DIAGNOSIS — E55.9 VITAMIN D DEFICIENCY: ICD-10-CM

## 2021-05-25 DIAGNOSIS — R53.1 WEAKNESS GENERALIZED: ICD-10-CM

## 2021-05-25 DIAGNOSIS — I10 ESSENTIAL HYPERTENSION: ICD-10-CM

## 2021-05-25 DIAGNOSIS — E53.8 VITAMIN B 12 DEFICIENCY: ICD-10-CM

## 2021-05-25 DIAGNOSIS — R53.82 CHRONIC FATIGUE: ICD-10-CM

## 2021-06-25 ENCOUNTER — HOSPITAL ENCOUNTER (EMERGENCY)
Facility: CLINIC | Age: 86
Discharge: HOME | End: 2021-06-25
Attending: EMERGENCY MEDICINE
Payer: COMMERCIAL

## 2021-06-25 ENCOUNTER — APPOINTMENT (OUTPATIENT)
Dept: CT IMAGING | Facility: CLINIC | Age: 86
End: 2021-06-25
Payer: COMMERCIAL

## 2021-06-25 VITALS
HEIGHT: 59 IN | TEMPERATURE: 98.6 F | WEIGHT: 101 LBS | RESPIRATION RATE: 14 BRPM | OXYGEN SATURATION: 99 % | BODY MASS INDEX: 20.36 KG/M2 | HEART RATE: 80 BPM | SYSTOLIC BLOOD PRESSURE: 165 MMHG | DIASTOLIC BLOOD PRESSURE: 76 MMHG

## 2021-06-25 DIAGNOSIS — S09.90XA CLOSED HEAD INJURY, INITIAL ENCOUNTER: ICD-10-CM

## 2021-06-25 DIAGNOSIS — S16.1XXA ACUTE STRAIN OF NECK MUSCLE, INITIAL ENCOUNTER: Primary | ICD-10-CM

## 2021-06-25 PROCEDURE — 99283 EMERGENCY DEPT VISIT LOW MDM: CPT

## 2021-06-25 PROCEDURE — 70450 CT HEAD/BRAIN W/O DYE: CPT

## 2021-06-25 PROCEDURE — 72125 CT NECK SPINE W/O DYE: CPT

## 2021-06-25 PROCEDURE — 6370000000 HC RX 637 (ALT 250 FOR IP): Performed by: EMERGENCY MEDICINE

## 2021-06-25 RX ORDER — ATENOLOL 25 MG/1
25 TABLET ORAL DAILY
COMMUNITY
End: 2021-11-04 | Stop reason: SDUPTHER

## 2021-06-25 RX ORDER — ACETAMINOPHEN 500 MG
1000 TABLET ORAL ONCE
Status: COMPLETED | OUTPATIENT
Start: 2021-06-25 | End: 2021-06-25

## 2021-06-25 RX ADMIN — ACETAMINOPHEN 1000 MG: 500 TABLET ORAL at 15:36

## 2021-06-25 ASSESSMENT — PAIN DESCRIPTION - LOCATION: LOCATION: GENERALIZED

## 2021-06-25 ASSESSMENT — PAIN DESCRIPTION - PROGRESSION: CLINICAL_PROGRESSION: NOT CHANGED

## 2021-06-25 ASSESSMENT — ENCOUNTER SYMPTOMS: VOMITING: 0

## 2021-06-25 ASSESSMENT — PAIN DESCRIPTION - DESCRIPTORS: DESCRIPTORS: ACHING

## 2021-06-25 ASSESSMENT — PAIN DESCRIPTION - PAIN TYPE: TYPE: ACUTE PAIN

## 2021-06-25 ASSESSMENT — PAIN DESCRIPTION - FREQUENCY: FREQUENCY: CONTINUOUS

## 2021-06-25 ASSESSMENT — PAIN DESCRIPTION - ONSET: ONSET: SUDDEN

## 2021-06-25 ASSESSMENT — PAIN SCALES - GENERAL: PAINLEVEL_OUTOF10: 7

## 2021-07-02 ENCOUNTER — OFFICE VISIT (OUTPATIENT)
Dept: FAMILY MEDICINE CLINIC | Age: 86
End: 2021-07-02
Payer: MEDICARE

## 2021-07-02 VITALS
BODY MASS INDEX: 21.41 KG/M2 | TEMPERATURE: 97 F | HEART RATE: 114 BPM | SYSTOLIC BLOOD PRESSURE: 130 MMHG | OXYGEN SATURATION: 98 % | WEIGHT: 106 LBS | DIASTOLIC BLOOD PRESSURE: 70 MMHG

## 2021-07-02 DIAGNOSIS — I10 ESSENTIAL HYPERTENSION: ICD-10-CM

## 2021-07-02 DIAGNOSIS — R53.1 WEAKNESS GENERALIZED: ICD-10-CM

## 2021-07-02 DIAGNOSIS — H60.331 ACUTE SWIMMER'S EAR OF RIGHT SIDE: ICD-10-CM

## 2021-07-02 DIAGNOSIS — R60.0 BILATERAL LEG EDEMA: Primary | ICD-10-CM

## 2021-07-02 PROCEDURE — 4040F PNEUMOC VAC/ADMIN/RCVD: CPT | Performed by: FAMILY MEDICINE

## 2021-07-02 PROCEDURE — G8399 PT W/DXA RESULTS DOCUMENT: HCPCS | Performed by: FAMILY MEDICINE

## 2021-07-02 PROCEDURE — 99214 OFFICE O/P EST MOD 30 MIN: CPT | Performed by: FAMILY MEDICINE

## 2021-07-02 PROCEDURE — 1036F TOBACCO NON-USER: CPT | Performed by: FAMILY MEDICINE

## 2021-07-02 PROCEDURE — G8427 DOCREV CUR MEDS BY ELIG CLIN: HCPCS | Performed by: FAMILY MEDICINE

## 2021-07-02 PROCEDURE — G8420 CALC BMI NORM PARAMETERS: HCPCS | Performed by: FAMILY MEDICINE

## 2021-07-02 PROCEDURE — 1123F ACP DISCUSS/DSCN MKR DOCD: CPT | Performed by: FAMILY MEDICINE

## 2021-07-02 PROCEDURE — 4130F TOPICAL PREP RX AOE: CPT | Performed by: FAMILY MEDICINE

## 2021-07-02 PROCEDURE — 1090F PRES/ABSN URINE INCON ASSESS: CPT | Performed by: FAMILY MEDICINE

## 2021-07-02 RX ORDER — HYDROCHLOROTHIAZIDE 12.5 MG/1
12.5 TABLET ORAL DAILY
Qty: 30 TABLET | Refills: 5 | Status: SHIPPED | OUTPATIENT
Start: 2021-07-02

## 2021-07-02 RX ORDER — CIPROFLOXACIN/HYDROCORTISONE 0.2 %-1 %
3 SUSPENSION, DROPS(FINAL DOSAGE FORM)(ML) OTIC (EAR) 2 TIMES DAILY
Qty: 10 ML | Refills: 0 | Status: SHIPPED | OUTPATIENT
Start: 2021-07-02 | End: 2021-07-09

## 2021-07-02 SDOH — ECONOMIC STABILITY: FOOD INSECURITY: WITHIN THE PAST 12 MONTHS, YOU WORRIED THAT YOUR FOOD WOULD RUN OUT BEFORE YOU GOT MONEY TO BUY MORE.: NEVER TRUE

## 2021-07-02 SDOH — ECONOMIC STABILITY: FOOD INSECURITY: WITHIN THE PAST 12 MONTHS, THE FOOD YOU BOUGHT JUST DIDN'T LAST AND YOU DIDN'T HAVE MONEY TO GET MORE.: NEVER TRUE

## 2021-07-02 ASSESSMENT — ENCOUNTER SYMPTOMS
DIARRHEA: 0
SHORTNESS OF BREATH: 0
CONSTIPATION: 0
ALLERGIC/IMMUNOLOGIC NEGATIVE: 1
ABDOMINAL PAIN: 0
EYES NEGATIVE: 1
BLOOD IN STOOL: 1
COUGH: 0

## 2021-07-02 ASSESSMENT — SOCIAL DETERMINANTS OF HEALTH (SDOH): HOW HARD IS IT FOR YOU TO PAY FOR THE VERY BASICS LIKE FOOD, HOUSING, MEDICAL CARE, AND HEATING?: NOT HARD AT ALL
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
## 2021-07-16 ENCOUNTER — TELEPHONE (OUTPATIENT)
Dept: FAMILY MEDICINE CLINIC | Age: 86
End: 2021-07-16

## 2021-07-16 DIAGNOSIS — H92.09 OTALGIA, UNSPECIFIED LATERALITY: Primary | ICD-10-CM

## 2021-07-30 ENCOUNTER — TELEPHONE (OUTPATIENT)
Dept: FAMILY MEDICINE CLINIC | Age: 86
End: 2021-07-30

## 2021-07-30 DIAGNOSIS — H92.09 OTALGIA, UNSPECIFIED LATERALITY: Primary | ICD-10-CM

## 2021-07-30 NOTE — TELEPHONE ENCOUNTER
----- Message from Sherren Dickens sent at 7/30/2021 12:59 PM EDT -----  Subject: Referral Request    QUESTIONS   Reason for referral request? PT was given a referral to see [facility]. She would like to know if you could give her a referral elsewhere   due to she is not able to get scheduled until September and she is still   in pain with her ear. Has the physician seen you for this condition before? Yes  Select a date? 2021-07-02  Select the Provider the patient wants to be referred to, if known (PCP or   Specialist)? Outside Physician - Does not know   Preferred Specialist (if applicable)? Do you already have an appointment scheduled? No  Additional Information for Provider? PT has a difficult time getting in   touch with the original ENT she was referred to. PT feels unbalanced as   well.   ---------------------------------------------------------------------------  --------------  CALL BACK INFO  What is the best way for the office to contact you? OK to leave message on   voicemail  Preferred Call Back Phone Number?  [phone]

## 2021-08-17 ENCOUNTER — TELEPHONE (OUTPATIENT)
Dept: FAMILY MEDICINE CLINIC | Age: 86
End: 2021-08-17

## 2021-08-17 NOTE — TELEPHONE ENCOUNTER
The pt is asking for prolia inj, since Dr Delfin Boss can not assist until after that. Fax referral to: [address] to the pt's dtr, and Pain Management advised the pt to do the prolia injections first. Prior to any further treatment   Lisbeth Hernandez Md - advanced pain management. Problems walking, after 10 steps of walking, pt is in severe pain. Had bone density done at: [facility] clinic: 6/21  Called and requested records: [phone].

## 2021-08-19 DIAGNOSIS — G43.709 CHRONIC MIGRAINE WITHOUT AURA WITHOUT STATUS MIGRAINOSUS, NOT INTRACTABLE: ICD-10-CM

## 2021-08-19 RX ORDER — BUTALBITAL, ACETAMINOPHEN AND CAFFEINE 50; 325; 40 MG/1; MG/1; MG/1
TABLET ORAL
Qty: 180 TABLET | Refills: 2 | Status: SHIPPED | OUTPATIENT
Start: 2021-08-19 | End: 2021-11-04 | Stop reason: SDUPTHER
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
## 2021-09-21 PROBLEM — S06.5XAA SUBDURAL HEMATOMA (HCC): Status: ACTIVE | Noted: 2019-02-10

## 2021-09-21 PROBLEM — Z91.81 HISTORY OF FALLING: Status: ACTIVE | Noted: 2019-02-13

## 2021-09-21 PROBLEM — Z96.611 S/P REVERSE TOTAL SHOULDER ARTHROPLASTY, RIGHT: Status: ACTIVE | Noted: 2017-07-05

## 2021-09-21 PROBLEM — G43.909 MIGRAINE: Status: ACTIVE | Noted: 2017-04-24

## 2021-09-21 PROBLEM — K21.9 GASTRO-ESOPHAGEAL REFLUX DISEASE WITHOUT ESOPHAGITIS: Status: ACTIVE | Noted: 2019-02-13

## 2021-09-21 PROBLEM — K57.32 DIVERTICULITIS OF LARGE INTESTINE WITHOUT PERFORATION OR ABSCESS WITHOUT BLEEDING: Status: ACTIVE | Noted: 2019-02-13

## 2021-09-21 PROBLEM — M62.81 MUSCLE WEAKNESS (GENERALIZED): Status: ACTIVE | Noted: 2019-02-13

## 2021-09-21 PROBLEM — K90.9 INTESTINAL MALABSORPTION: Status: ACTIVE | Noted: 2017-04-05

## 2021-09-21 PROBLEM — R48.8 OTHER SYMBOLIC DYSFUNCTIONS: Status: ACTIVE | Noted: 2019-02-13

## 2021-09-21 PROBLEM — S42.121A: Status: ACTIVE | Noted: 2017-12-06

## 2021-09-21 PROBLEM — M81.0 AGE-RELATED OSTEOPOROSIS WITHOUT CURRENT PATHOLOGICAL FRACTURE: Status: ACTIVE | Noted: 2019-02-13

## 2021-09-21 PROBLEM — Z98.890 HISTORY OF REVERSE TOTAL REPLACEMENT OF LEFT SHOULDER JOINT: Status: ACTIVE | Noted: 2020-07-23

## 2021-09-21 PROBLEM — J30.9 ALLERGIC RHINITIS, UNSPECIFIED: Status: ACTIVE | Noted: 2019-02-13

## 2021-09-21 PROBLEM — S06.5X9D: Status: ACTIVE | Noted: 2019-02-13

## 2021-09-21 PROBLEM — F33.9 MAJOR DEPRESSIVE DISORDER, RECURRENT, UNSPECIFIED (HCC): Status: ACTIVE | Noted: 2019-02-13

## 2021-09-21 PROBLEM — Z96.612 HISTORY OF REVERSE TOTAL REPLACEMENT OF LEFT SHOULDER JOINT: Status: ACTIVE | Noted: 2020-07-23

## 2021-09-21 PROBLEM — R10.9 ABDOMINAL PAIN: Status: ACTIVE | Noted: 2017-03-15

## 2021-09-21 PROBLEM — R13.19 OTHER DYSPHAGIA: Status: ACTIVE | Noted: 2019-02-13

## 2021-10-01 DIAGNOSIS — G43.709 CHRONIC MIGRAINE WITHOUT AURA WITHOUT STATUS MIGRAINOSUS, NOT INTRACTABLE: ICD-10-CM

## 2021-10-01 DIAGNOSIS — F51.01 PRIMARY INSOMNIA: ICD-10-CM

## 2021-10-01 RX ORDER — DOXEPIN HYDROCHLORIDE 25 MG/1
50 CAPSULE ORAL 2 TIMES DAILY
Qty: 120 CAPSULE | Refills: 3 | Status: SHIPPED | OUTPATIENT
Start: 2021-10-01

## 2021-11-04 ENCOUNTER — OFFICE VISIT (OUTPATIENT)
Dept: FAMILY MEDICINE CLINIC | Age: 86
End: 2021-11-04
Payer: MEDICARE

## 2021-11-04 VITALS
HEART RATE: 82 BPM | WEIGHT: 102.4 LBS | HEIGHT: 59 IN | OXYGEN SATURATION: 98 % | TEMPERATURE: 97 F | BODY MASS INDEX: 20.64 KG/M2 | SYSTOLIC BLOOD PRESSURE: 120 MMHG | DIASTOLIC BLOOD PRESSURE: 80 MMHG

## 2021-11-04 DIAGNOSIS — G43.709 CHRONIC MIGRAINE WITHOUT AURA WITHOUT STATUS MIGRAINOSUS, NOT INTRACTABLE: ICD-10-CM

## 2021-11-04 DIAGNOSIS — E55.9 VITAMIN D DEFICIENCY: ICD-10-CM

## 2021-11-04 DIAGNOSIS — R53.82 CHRONIC FATIGUE: ICD-10-CM

## 2021-11-04 DIAGNOSIS — M81.0 AGE-RELATED OSTEOPOROSIS WITHOUT CURRENT PATHOLOGICAL FRACTURE: ICD-10-CM

## 2021-11-04 DIAGNOSIS — Z00.00 ROUTINE GENERAL MEDICAL EXAMINATION AT A HEALTH CARE FACILITY: Primary | ICD-10-CM

## 2021-11-04 DIAGNOSIS — Z23 NEEDS FLU SHOT: ICD-10-CM

## 2021-11-04 DIAGNOSIS — I10 ESSENTIAL HYPERTENSION: ICD-10-CM

## 2021-11-04 DIAGNOSIS — G35 MULTIPLE SCLEROSIS (HCC): ICD-10-CM

## 2021-11-04 DIAGNOSIS — F51.01 PRIMARY INSOMNIA: ICD-10-CM

## 2021-11-04 DIAGNOSIS — F33.41 RECURRENT MAJOR DEPRESSIVE DISORDER, IN PARTIAL REMISSION (HCC): ICD-10-CM

## 2021-11-04 DIAGNOSIS — G89.29 CHRONIC RIGHT-SIDED LOW BACK PAIN WITH RIGHT-SIDED SCIATICA: ICD-10-CM

## 2021-11-04 DIAGNOSIS — E78.2 MIXED HYPERLIPIDEMIA: ICD-10-CM

## 2021-11-04 DIAGNOSIS — R53.1 WEAKNESS GENERALIZED: ICD-10-CM

## 2021-11-04 DIAGNOSIS — M54.41 CHRONIC RIGHT-SIDED LOW BACK PAIN WITH RIGHT-SIDED SCIATICA: ICD-10-CM

## 2021-11-04 PROCEDURE — G0439 PPPS, SUBSEQ VISIT: HCPCS | Performed by: FAMILY MEDICINE

## 2021-11-04 PROCEDURE — 90694 VACC AIIV4 NO PRSRV 0.5ML IM: CPT | Performed by: FAMILY MEDICINE

## 2021-11-04 PROCEDURE — G8484 FLU IMMUNIZE NO ADMIN: HCPCS | Performed by: FAMILY MEDICINE

## 2021-11-04 PROCEDURE — 4040F PNEUMOC VAC/ADMIN/RCVD: CPT | Performed by: FAMILY MEDICINE

## 2021-11-04 PROCEDURE — G0008 ADMIN INFLUENZA VIRUS VAC: HCPCS | Performed by: FAMILY MEDICINE

## 2021-11-04 PROCEDURE — 1123F ACP DISCUSS/DSCN MKR DOCD: CPT | Performed by: FAMILY MEDICINE

## 2021-11-04 RX ORDER — ATENOLOL 25 MG/1
25 TABLET ORAL DAILY
Qty: 30 TABLET | Refills: 5 | Status: SHIPPED | OUTPATIENT
Start: 2021-11-04 | End: 2022-10-17

## 2021-11-04 RX ORDER — BUTALBITAL, ACETAMINOPHEN AND CAFFEINE 50; 325; 40 MG/1; MG/1; MG/1
1 TABLET ORAL EVERY 4 HOURS PRN
Qty: 180 TABLET | Refills: 2 | Status: SHIPPED | OUTPATIENT
Start: 2021-11-04

## 2021-11-04 ASSESSMENT — LIFESTYLE VARIABLES
HAVE YOU OR SOMEONE ELSE BEEN INJURED AS A RESULT OF YOUR DRINKING: 0
HAS A RELATIVE, FRIEND, DOCTOR, OR ANOTHER HEALTH PROFESSIONAL EXPRESSED CONCERN ABOUT YOUR DRINKING OR SUGGESTED YOU CUT DOWN: 0
HOW MANY STANDARD DRINKS CONTAINING ALCOHOL DO YOU HAVE ON A TYPICAL DAY: 0
HOW OFTEN DO YOU HAVE SIX OR MORE DRINKS ON ONE OCCASION: 0
HOW OFTEN DO YOU HAVE A DRINK CONTAINING ALCOHOL: 1
AUDIT TOTAL SCORE: 1
HOW OFTEN DURING THE LAST YEAR HAVE YOU BEEN UNABLE TO REMEMBER WHAT HAPPENED THE NIGHT BEFORE BECAUSE YOU HAD BEEN DRINKING: 0
HOW OFTEN DURING THE LAST YEAR HAVE YOU FOUND THAT YOU WERE NOT ABLE TO STOP DRINKING ONCE YOU HAD STARTED: 0
HOW OFTEN DURING THE LAST YEAR HAVE YOU HAD A FEELING OF GUILT OR REMORSE AFTER DRINKING: 0
AUDIT-C TOTAL SCORE: 1
HOW OFTEN DURING THE LAST YEAR HAVE YOU FAILED TO DO WHAT WAS NORMALLY EXPECTED FROM YOU BECAUSE OF DRINKING: 0
HOW OFTEN DURING THE LAST YEAR HAVE YOU NEEDED AN ALCOHOLIC DRINK FIRST THING IN THE MORNING TO GET YOURSELF GOING AFTER A NIGHT OF HEAVY DRINKING: 0

## 2021-11-04 ASSESSMENT — ENCOUNTER SYMPTOMS
ALLERGIC/IMMUNOLOGIC NEGATIVE: 1
BLOOD IN STOOL: 0
ABDOMINAL PAIN: 0
CONSTIPATION: 0
SHORTNESS OF BREATH: 0
BACK PAIN: 1
EYES NEGATIVE: 1
DIARRHEA: 0
COUGH: 0

## 2021-11-04 ASSESSMENT — PATIENT HEALTH QUESTIONNAIRE - PHQ9
2. FEELING DOWN, DEPRESSED OR HOPELESS: 1
1. LITTLE INTEREST OR PLEASURE IN DOING THINGS: 1
SUM OF ALL RESPONSES TO PHQ QUESTIONS 1-9: 2
SUM OF ALL RESPONSES TO PHQ QUESTIONS 1-9: 2
SUM OF ALL RESPONSES TO PHQ9 QUESTIONS 1 & 2: 2
SUM OF ALL RESPONSES TO PHQ QUESTIONS 1-9: 2

## 2021-11-04 NOTE — PROGRESS NOTES
Vaccine Information Sheet, \"Influenza - Inactivated\"  given to John Chaudhary, or parent/legal guardian of  John Chaudhary and verbalized understanding. Patient responses:    Have you ever had a reaction to a flu vaccine? No  Are you able to eat eggs without adverse effects? Yes  Do you have any current illness? No  Have you ever had Guillian Philomath Syndrome? No    Flu vaccine given per order. Please see immunization tab.

## 2021-11-04 NOTE — PATIENT INSTRUCTIONS
Personalized Preventive Plan for [place] - 11/4/2021  Medicare offers a range of preventive health benefits. Some of the tests and screenings are paid in full while other may be subject to a deductible, co-insurance, and/or copay. Some of these benefits include a comprehensive review of your medical history including lifestyle, illnesses that may run in your family, and various assessments and screenings as appropriate. After reviewing your medical record and screening and assessments performed today your provider may have ordered immunizations, labs, imaging, and/or referrals for you. A list of these orders (if applicable) as well as your Preventive Care list are included within your After Visit Summary for your review. Other Preventive Recommendations:    · A preventive eye exam performed by an eye specialist is recommended every 1-2 years to screen for glaucoma; cataracts, macular degeneration, and other eye disorders. · A preventive dental visit is recommended every 6 months. · Try to get at least 150 minutes of exercise per week or 10,000 steps per day on a pedometer . · Order or download the FREE \"Exercise & Physical Activity: Your Everyday Guide\" from [employer]. Call 9-834.562.2686 or search [employer] online. · You need 9019-2428 mg of calcium and 4251-0851 IU of vitamin D per day. It is possible to meet your calcium requirement with diet alone, but a vitamin D supplement is usually necessary to meet this goal.  · When exposed to the sun, use a sunscreen that protects against both UVA and UVB radiation with an SPF of 30 or greater. Reapply every 2 to 3 hours or after sweating, drying off with a towel, or swimming. · Always wear a seat belt when traveling in a car. Always wear a helmet when riding a bicycle or motorcycle.

## 2021-11-04 NOTE — PROGRESS NOTES
Medicare Annual Wellness Visit  Name: Dian Licona Date: 2021   MRN: [id number] Sex: Female   Age: 82 y.o. Ethnicity: Unavailable / Unknown   : 1935 Race: White (non-)      Kathy De is here for Medicare AWV  Review of Systems   Constitutional: Positive for fatigue. Negative for fever and unexpected weight change. HENT: Negative. Eyes: Negative. Respiratory: Negative for cough and shortness of breath. Cardiovascular: Negative for chest pain and leg swelling. Gastrointestinal: Negative for abdominal pain, blood in stool, constipation and diarrhea. Endocrine: Negative. Genitourinary: Negative for frequency and urgency. Musculoskeletal: Positive for arthralgias and back pain (with rt sciatica). Skin: Negative. Allergic/Immunologic: Negative. Neurological: Positive for weakness (global) and headaches. Negative for dizziness. Hematological: Negative. Psychiatric/Behavioral: Negative for sleep disturbance. The patient is nervous/anxious. Screenings for behavioral, psychosocial and functional/safety risks, and cognitive dysfunction are all negative except as indicated below. These results, as well as other patient data from the [address] form, are documented in Flowsheets linked to this Encounter. Allergies   Allergen Reactions    Codeine     Levaquin [Levofloxacin In D5w]     Morphine     Novocain [Procaine]     Penicillins     Percocet [Oxycodone-Acetaminophen]     Sulfa Antibiotics     Vicodin [Hydrocodone-Acetaminophen]          Prior to Visit Medications    Medication Sig Taking?  Authorizing Provider   butalbital-acetaminophen-caffeine (FIORICET, ESGIC) -40 MG per tablet Take 1 tablet by mouth every 4 hours as needed for Headaches Yes Angelita Fuller MD   atenolol (TENORMIN) 25 MG tablet Take 1 tablet by mouth daily Yes Angelita Fuller MD   doxepin (SINEQUAN) 25 MG capsule Take 2 capsules by mouth 2 times daily Yes Ok Jones MD   hydroCHLOROthiazide (HYDRODIURIL) 12.5 MG tablet Take 1 tablet by mouth daily Yes Ok Jones MD   valACYclovir (VALTREX) 500 MG tablet Take 1 tablet by mouth 2 times daily Yes Ok Jones MD   losartan (COZAAR) 25 MG tablet Take 25 mg by mouth daily Yes Historical Provider, MD   simvastatin (ZOCOR) 10 MG tablet Take 1 tablet by mouth nightly Yes Ok Jones MD   Handicap Placard MISC by Does not apply route Expires 5 years from date of initiation Yes ANA PAULA Brady - CNP   Aspirin-Acetaminophen-Caffeine ([address]057 PO) Take by mouth Yes Historical Provider, MD   ibandronate (BONIVA) 150 MG tablet Take 1 tablet by mouth every 30 days  Patient not taking: Reported on 7/2/2021  Ok Jones MD   clindamycin (CLEOCIN) 300 MG capsule   Historical Provider, MD         Past Medical History:   Diagnosis Date    Allergy     Anemia     Iron deficiency    Anxiety     Depression     Diverticulitis     Dizziness     Fracture     Left pubic    Gait disturbance     Genital herpes     GERD (gastroesophageal reflux disease)     Headache     Hyperlipidemia     Hypertension     Multiple sclerosis ([address]     Osteoarthritis     Osteoporosis     Parkinsons ([address]     Swallowing impairment     Vision disturbance        Past Surgical History:   Procedure Laterality Date    APPENDECTOMY      COLONOSCOPY      EYE SURGERY      cataract    HERNIA REPAIR      HYSTERECTOMY      JOINT REPLACEMENT  2007    left shoulder    REVISION SHOULDER ARTHROPLASTY  2017    [facility]           Family History   Problem Relation Age of Onset    Cancer Mother     Glaucoma Mother     Other Mother 50        peritonitis    Diabetes Father     High Blood Pressure Father     Heart Disease Father     Ulcerative Colitis Father     Stroke Father        CareTeam (Including outside providers/suppliers regularly involved in providing care):   Patient Care Team:  Crystal Olmedo MD as PCP - General (Family Medicine)  Crystal Olmedo MD as PCP - [facility] Empaneled Provider    Wt Readings from Last 3 Encounters:   11/04/21 102 lb 6.4 oz (46.4 kg)   07/02/21 106 lb (48.1 kg)   06/25/21 101 lb (45.8 kg)     Vitals:    11/04/21 1408   BP: 120/80   Pulse: 82   Temp: 97 °F (36.1 °C)   SpO2: 98%   Weight: 102 lb 6.4 oz (46.4 kg)   Height: 4' 11\" (1.499 m)     Body mass index is 20.68 kg/m². Based upon direct observation of the patient, evaluation of cognition reveals recent and remote memory intact. Physical Exam  Vitals reviewed. Constitutional:       Appearance: She is well-developed. Comments: Frail elderly female   HENT:      Head: Normocephalic. Eyes:      Conjunctiva/sclera: Conjunctivae normal.      Pupils: Pupils are equal, round, and reactive to light. Neck:      Thyroid: No thyromegaly. Cardiovascular:      Rate and Rhythm: Normal rate and regular rhythm. Heart sounds: Normal heart sounds. No murmur heard. Pulmonary:      Effort: Pulmonary effort is normal.      Breath sounds: Normal breath sounds. No wheezing or rales. Abdominal:      Palpations: Abdomen is soft. Tenderness: There is no abdominal tenderness. There is no guarding or rebound. Musculoskeletal:         General: Deformity (oa ) present. No tenderness. Normal range of motion. Cervical back: Normal range of motion and neck supple. Lymphadenopathy:      Cervical: No cervical adenopathy. Skin:     General: Skin is warm and dry. Neurological:      Mental Status: She is alert and oriented to person, place, and time. Motor: Weakness present. Gait: Gait abnormal (unsteady). Psychiatric:         Mood and Affect: Mood normal.         Behavior: Behavior normal.         Thought Content:  Thought content normal.         Judgment: Judgment normal.           Patient's complete Health Risk Assessment and screening values have been reviewed and are found in Flowsheets. The following problems were reviewed today and where indicated follow up appointments were made and/or referrals ordered. Positive Risk Factor Screenings with Interventions:            General Health and ACP:  General  In general, how would you say your health is?: Fair  In the past 7 days, have you experienced any of the following?  New or Increased Pain, New or Increased Fatigue, Loneliness, Social Isolation, Stress or Anger?: (!) New or Increased Pain  Advance Directives     Power of  Living Will ACP-Advance Directive ACP-Power of     Not on File Not on File Not on File Not on File      General Health Risk Interventions:  · Pain issues: cont to stay active in her home    Health Habits/Nutrition:  Health Habits/Nutrition  Do you exercise for at least 20 minutes 2-3 times per week?: (!) No  Have you lost any weight without trying in the past 3 months?: (!) Yes  Do you eat only one meal per day?: No  Have you seen the dentist within the past year?: Yes  Body mass index: 20.68  Health Habits/Nutrition Interventions:  · Inadequate physical activity:  patient is not ready to increase his/her physical activity level at this time    Hearing/Vision:  No exam data present  Hearing/Vision  Do you or your family notice any trouble with your hearing that hasn't been managed with hearing aids?: (!) Yes  Do you have difficulty driving, watching TV, or doing any of your daily activities because of your eyesight?: No  Have you had an eye exam within the past year?: Yes  Hearing/Vision Interventions:  · Hearing concerns:  patient declines any further evaluation/treatment for hearing issues    Safety:  Safety  Do you have working smoke detectors?: Yes  Have all throw rugs been removed or fastened?: (!) No  Do you have non-slip mats or surfaces in all bathtubs/showers?: Yes  Do all of your stairways have a railing or banister?: (!) Completed    Pneumococcal 65+ years Vaccine  Completed    Hepatitis A vaccine  Aged Out    Hepatitis B vaccine  Aged Out    Hib vaccine  Aged Out    Meningococcal (ACWY) vaccine  Aged Out     Recommendations for [employer] Due: see orders and patient instructions/AVS.  . Recommended screening schedule for the next 5-10 years is provided to the patient in written form: see Patient Errol Maurer was seen today for medicare awv. Diagnoses and all orders for this visit:    Routine general medical examination at a health care facility    Recurrent major depressive disorder, in partial remission ([facility]    Vitamin D deficiency    Weakness generalized  -     External Referral To Physical Therapy    Chronic fatigue    Essential hypertension  -     atenolol (TENORMIN) 25 MG tablet; Take 1 tablet by mouth daily    Primary insomnia    Mixed hyperlipidemia    Age-related osteoporosis without current pathological fracture    Chronic right-sided low back pain with right-sided sciatica    Multiple sclerosis ([facility]  -     External Referral To Physical Therapy    Chronic migraine without aura without status migrainosus, not intractable  -     butalbital-acetaminophen-caffeine (FIORICET, ESGIC) -40 MG per tablet;  Take 1 tablet by mouth every 4 hours as needed for Headaches             Referral for driving evaluation at [facility]

## 2021-12-27 ENCOUNTER — TELEPHONE (OUTPATIENT)
Dept: FAMILY MEDICINE CLINIC | Age: 86
End: 2021-12-27

## 2021-12-27 DIAGNOSIS — J01.90 ACUTE NON-RECURRENT SINUSITIS, UNSPECIFIED LOCATION: Primary | ICD-10-CM

## 2021-12-27 RX ORDER — AZITHROMYCIN 250 MG/1
TABLET, FILM COATED ORAL
Qty: 1 PACKET | Refills: 0 | Status: SHIPPED | OUTPATIENT
Start: 2021-12-27 | End: 2022-07-19 | Stop reason: ALTCHOICE

## 2021-12-27 NOTE — TELEPHONE ENCOUNTER
Pt called asking for: Yudi    She has the following:    Sore throat   Sneezing  Sinus drainage, clear    Has had this for 3 days. Scheduled for surgery next week. Not been tested for covid in the last 2 weeks. Per pt: Is fully vaccinated.       Pharmacy    [address], [address] [place] [address]

## 2022-01-10 ENCOUNTER — HOSPITAL ENCOUNTER (OUTPATIENT)
Age: 87
Discharge: HOME | End: 2022-01-10
Payer: MEDICARE

## 2022-01-10 LAB
ANION GAP SERPL CALCULATED.3IONS-SCNC: 12 MMOL/L (ref 9–17)
BUN BLDV-MCNC: 16 MG/DL (ref 8–23)
BUN/CREAT BLD: ABNORMAL (ref 9–20)
CALCIUM SERPL-MCNC: 8.6 MG/DL (ref 8.6–10.4)
CHLORIDE BLD-SCNC: 100 MMOL/L (ref 98–107)
CO2: 22 MMOL/L (ref 20–31)
CREAT SERPL-MCNC: 0.67 MG/DL (ref 0.5–0.9)
GFR AFRICAN AMERICAN: >60 ML/MIN
GFR NON-AFRICAN AMERICAN: >60 ML/MIN
GFR SERPL CREATININE-BSD FRML MDRD: ABNORMAL ML/MIN/{1.73_M2}
GFR SERPL CREATININE-BSD FRML MDRD: ABNORMAL ML/MIN/{1.73_M2}
GLUCOSE BLD-MCNC: 79 MG/DL (ref 70–99)
HCT VFR BLD CALC: 30.4 % (ref 36.3–47.1)
HEMOGLOBIN: 8.9 G/DL (ref 11.9–15.1)
MCH RBC QN AUTO: 23.4 PG (ref 25.2–33.5)
MCHC RBC AUTO-ENTMCNC: 29.3 G/DL (ref 28.4–34.8)
MCV RBC AUTO: 80 FL (ref 82.6–102.9)
NRBC AUTOMATED: 0 PER 100 WBC
PDW BLD-RTO: 18.5 % (ref 11.8–14.4)
PLATELET # BLD: 447 K/UL (ref 138–453)
PMV BLD AUTO: 9 FL (ref 8.1–13.5)
POTASSIUM SERPL-SCNC: 4.9 MMOL/L (ref 3.7–5.3)
RBC # BLD: 3.8 M/UL (ref 3.95–5.11)
SODIUM BLD-SCNC: 134 MMOL/L (ref 135–144)
WBC # BLD: 8.7 K/UL (ref 3.5–11.3)

## 2022-01-10 PROCEDURE — 36415 COLL VENOUS BLD VENIPUNCTURE: CPT

## 2022-01-10 PROCEDURE — 80048 BASIC METABOLIC PNL TOTAL CA: CPT

## 2022-01-10 PROCEDURE — 85027 COMPLETE CBC AUTOMATED: CPT

## 2022-01-10 PROCEDURE — P9603 ONE-WAY ALLOW PRORATED MILES: HCPCS

## 2022-01-17 ENCOUNTER — HOSPITAL ENCOUNTER (OUTPATIENT)
Age: 87
Setting detail: SPECIMEN
Discharge: HOME | End: 2022-01-17

## 2022-01-17 LAB
ANION GAP SERPL CALCULATED.3IONS-SCNC: 12 MMOL/L (ref 9–17)
BUN BLDV-MCNC: 26 MG/DL (ref 8–23)
BUN/CREAT BLD: ABNORMAL (ref 9–20)
CALCIUM SERPL-MCNC: 9 MG/DL (ref 8.6–10.4)
CHLORIDE BLD-SCNC: 103 MMOL/L (ref 98–107)
CO2: 23 MMOL/L (ref 20–31)
CREAT SERPL-MCNC: 0.67 MG/DL (ref 0.5–0.9)
GFR AFRICAN AMERICAN: >60 ML/MIN
GFR NON-AFRICAN AMERICAN: >60 ML/MIN
GFR SERPL CREATININE-BSD FRML MDRD: ABNORMAL ML/MIN/{1.73_M2}
GFR SERPL CREATININE-BSD FRML MDRD: ABNORMAL ML/MIN/{1.73_M2}
GLUCOSE BLD-MCNC: 80 MG/DL (ref 70–99)
HCT VFR BLD CALC: 30.3 % (ref 36.3–47.1)
HEMOGLOBIN: 8.7 G/DL (ref 11.9–15.1)
MCH RBC QN AUTO: 23.1 PG (ref 25.2–33.5)
MCHC RBC AUTO-ENTMCNC: 28.7 G/DL (ref 28.4–34.8)
MCV RBC AUTO: 80.6 FL (ref 82.6–102.9)
NRBC AUTOMATED: 0 PER 100 WBC
PDW BLD-RTO: 17.6 % (ref 11.8–14.4)
PLATELET # BLD: 374 K/UL (ref 138–453)
PMV BLD AUTO: 9 FL (ref 8.1–13.5)
POTASSIUM SERPL-SCNC: 4.3 MMOL/L (ref 3.7–5.3)
RBC # BLD: 3.76 M/UL (ref 3.95–5.11)
SODIUM BLD-SCNC: 138 MMOL/L (ref 135–144)
WBC # BLD: 6.8 K/UL (ref 3.5–11.3)

## 2022-01-17 PROCEDURE — 80048 BASIC METABOLIC PNL TOTAL CA: CPT

## 2022-01-17 PROCEDURE — 85027 COMPLETE CBC AUTOMATED: CPT

## 2022-01-17 PROCEDURE — 36415 COLL VENOUS BLD VENIPUNCTURE: CPT

## 2022-01-17 PROCEDURE — P9603 ONE-WAY ALLOW PRORATED MILES: HCPCS

## 2022-01-24 ENCOUNTER — HOSPITAL ENCOUNTER (OUTPATIENT)
Age: 87
Setting detail: SPECIMEN
Discharge: HOME | End: 2022-01-24

## 2022-01-24 LAB
ANION GAP SERPL CALCULATED.3IONS-SCNC: 13 MMOL/L (ref 9–17)
BUN BLDV-MCNC: 23 MG/DL (ref 8–23)
BUN/CREAT BLD: ABNORMAL (ref 9–20)
CALCIUM SERPL-MCNC: 9.1 MG/DL (ref 8.6–10.4)
CHLORIDE BLD-SCNC: 101 MMOL/L (ref 98–107)
CO2: 22 MMOL/L (ref 20–31)
CREAT SERPL-MCNC: 0.67 MG/DL (ref 0.5–0.9)
GFR AFRICAN AMERICAN: >60 ML/MIN
GFR NON-AFRICAN AMERICAN: >60 ML/MIN
GFR SERPL CREATININE-BSD FRML MDRD: ABNORMAL ML/MIN/{1.73_M2}
GFR SERPL CREATININE-BSD FRML MDRD: ABNORMAL ML/MIN/{1.73_M2}
GLUCOSE BLD-MCNC: 135 MG/DL (ref 70–99)
HCT VFR BLD CALC: 35.3 % (ref 36.3–47.1)
HEMOGLOBIN: 9.9 G/DL (ref 11.9–15.1)
MCH RBC QN AUTO: 22.9 PG (ref 25.2–33.5)
MCHC RBC AUTO-ENTMCNC: 28 G/DL (ref 28.4–34.8)
MCV RBC AUTO: 81.7 FL (ref 82.6–102.9)
NRBC AUTOMATED: 0 PER 100 WBC
PDW BLD-RTO: 17.2 % (ref 11.8–14.4)
PLATELET # BLD: 461 K/UL (ref 138–453)
PMV BLD AUTO: 9.1 FL (ref 8.1–13.5)
POTASSIUM SERPL-SCNC: 4.4 MMOL/L (ref 3.7–5.3)
RBC # BLD: 4.32 M/UL (ref 3.95–5.11)
SODIUM BLD-SCNC: 136 MMOL/L (ref 135–144)
WBC # BLD: 6.9 K/UL (ref 3.5–11.3)

## 2022-01-24 PROCEDURE — 36415 COLL VENOUS BLD VENIPUNCTURE: CPT

## 2022-01-24 PROCEDURE — 85027 COMPLETE CBC AUTOMATED: CPT

## 2022-01-24 PROCEDURE — 80048 BASIC METABOLIC PNL TOTAL CA: CPT

## 2022-01-24 PROCEDURE — P9603 ONE-WAY ALLOW PRORATED MILES: HCPCS

## 2022-03-28 ENCOUNTER — TELEPHONE (OUTPATIENT)
Dept: FAMILY MEDICINE CLINIC | Age: 87
End: 2022-03-28

## 2022-03-28 DIAGNOSIS — I10 ESSENTIAL HYPERTENSION: ICD-10-CM

## 2022-03-28 DIAGNOSIS — R30.0 DYSURIA: Primary | ICD-10-CM

## 2022-03-28 DIAGNOSIS — E55.9 VITAMIN D DEFICIENCY: ICD-10-CM

## 2022-03-28 DIAGNOSIS — R53.1 WEAKNESS GENERALIZED: ICD-10-CM

## 2022-03-28 DIAGNOSIS — D50.8 IRON DEFICIENCY ANEMIA SECONDARY TO INADEQUATE DIETARY IRON INTAKE: ICD-10-CM

## 2022-03-28 DIAGNOSIS — E53.8 VITAMIN B12 DEFICIENCY: ICD-10-CM

## 2022-03-28 NOTE — TELEPHONE ENCOUNTER
Left voice message on pt daughters phone did ask her to have hte other labs dne tomorrow when pt is at the lab also a ua.

## 2022-03-28 NOTE — TELEPHONE ENCOUNTER
Pt called stating has the following:  Dark Urine x 1 mos, no other symptoms as this time. Lightheadedness x 1 week  Usually drinks only 6 oz of water, some tea and coffee. As of yesterday, the pt increased some of her water intake to 2 bottles per day. Dtr was worried about her lightheadedness.

## 2022-03-29 LAB
ALBUMIN SERPL-MCNC: NORMAL G/DL
ALP BLD-CCNC: NORMAL U/L
ALT SERPL-CCNC: NORMAL U/L
ANION GAP SERPL CALCULATED.3IONS-SCNC: NORMAL MMOL/L
AST SERPL-CCNC: NORMAL U/L
BASOPHILS ABSOLUTE: NORMAL
BASOPHILS RELATIVE PERCENT: NORMAL
BILIRUB SERPL-MCNC: NORMAL MG/DL
BUN BLDV-MCNC: NORMAL MG/DL
CALCIUM SERPL-MCNC: NORMAL MG/DL
CHLORIDE BLD-SCNC: NORMAL MMOL/L
CO2: NORMAL
CREAT SERPL-MCNC: NORMAL MG/DL
EOSINOPHILS ABSOLUTE: NORMAL
EOSINOPHILS RELATIVE PERCENT: NORMAL
FERRITIN: NORMAL
GFR CALCULATED: NORMAL
GLUCOSE BLD-MCNC: NORMAL MG/DL
HCT VFR BLD CALC: NORMAL %
HEMOGLOBIN: NORMAL
IRON: NORMAL
LYMPHOCYTES ABSOLUTE: NORMAL
LYMPHOCYTES RELATIVE PERCENT: NORMAL
MCH RBC QN AUTO: NORMAL PG
MCHC RBC AUTO-ENTMCNC: NORMAL G/DL
MCV RBC AUTO: NORMAL FL
MONOCYTES ABSOLUTE: NORMAL
MONOCYTES RELATIVE PERCENT: NORMAL
NEUTROPHILS ABSOLUTE: NORMAL
NEUTROPHILS RELATIVE PERCENT: NORMAL
PDW BLD-RTO: NORMAL %
PLATELET # BLD: NORMAL 10*3/UL
PMV BLD AUTO: NORMAL FL
POTASSIUM SERPL-SCNC: NORMAL MMOL/L
RBC # BLD: NORMAL 10*6/UL
SODIUM BLD-SCNC: NORMAL MMOL/L
TOTAL PROTEIN: NORMAL
VITAMIN B-12: NORMAL
VITAMIN D 25-HYDROXY: NORMAL
VITAMIN D2, 25 HYDROXY: NORMAL
VITAMIN D3,25 HYDROXY: NORMAL
WBC # BLD: NORMAL 10*3/UL

## 2022-03-30 ENCOUNTER — TELEPHONE (OUTPATIENT)
Dept: FAMILY MEDICINE CLINIC | Age: 87
End: 2022-03-30

## 2022-03-30 LAB
BILIRUBIN URINE: NORMAL
BLOOD, URINE: NORMAL
CLARITY: NORMAL
COLOR: NORMAL
GLUCOSE URINE: NORMAL
KETONES, URINE: NORMAL
LEUKOCYTE ESTERASE, URINE: NORMAL
NITRITE, URINE: NORMAL
PH UA: NORMAL
PROTEIN UA: NORMAL
SPECIFIC GRAVITY UA: NORMAL
UROBILINOGEN, URINE: NORMAL

## 2022-03-30 NOTE — TELEPHONE ENCOUNTER
Please call patient and inform her that urinalysis looks normal.  Her kidney function is slightly decreased. She needs to stay well-hydrated. Her vitamin D is still as low as a year ago. Is she taking her vitamin D supplement? She is mildly anemic but the same as a year ago. Is she taking any iron supplement? Her iron level was okay but her iron storage is very low.

## 2022-04-06 DIAGNOSIS — R53.1 WEAKNESS GENERALIZED: ICD-10-CM

## 2022-04-06 DIAGNOSIS — E53.8 VITAMIN B12 DEFICIENCY: ICD-10-CM

## 2022-04-06 DIAGNOSIS — D50.8 IRON DEFICIENCY ANEMIA SECONDARY TO INADEQUATE DIETARY IRON INTAKE: ICD-10-CM

## 2022-04-06 DIAGNOSIS — R30.0 DYSURIA: ICD-10-CM

## 2022-04-06 DIAGNOSIS — E55.9 VITAMIN D DEFICIENCY: ICD-10-CM

## 2022-06-03 ENCOUNTER — TELEPHONE (OUTPATIENT)
Dept: FAMILY MEDICINE CLINIC | Age: 87
End: 2022-06-03

## 2022-06-03 DIAGNOSIS — K58.0 IRRITABLE BOWEL SYNDROME WITH DIARRHEA: Primary | ICD-10-CM

## 2022-06-03 RX ORDER — DICYCLOMINE HYDROCHLORIDE 10 MG/1
10 CAPSULE ORAL 4 TIMES DAILY
Qty: 120 CAPSULE | Refills: 0 | Status: SHIPPED | OUTPATIENT
Start: 2022-06-03 | End: 2022-07-04

## 2022-06-03 NOTE — TELEPHONE ENCOUNTER
Patient called stated that she is having a increase in diarrhea which is causing her to take extra underclothes with her when she leaves the house due to the fact that after she eats anything she has to go to the restroom. Patient is requesting a refill on bental that she was given previously didn't see on profile. Please advise.  Thank you

## 2022-06-03 NOTE — TELEPHONE ENCOUNTER
Pt informed. Subjective   Patient ID: Lynn is a 36 year old female.    Chief Complaint   Patient presents with   • Cough     HPI  Has productive cough with yellow phlegm, no nausea   No fever or chills, no shortness of breath has mild pain on swallowing  No dysphagia.  Also for management of asthma now with exacerbation due to respiratory infection       Lynn has a past medical history of Allergy. She also has no past medical history of Anemia, Anxiety, Arthritis, Cataract, Chronic kidney disease, COPD (chronic obstructive pulmonary disease) (CMS/[facility]), Depressive disorder, Diabetes mellitus (CMS/[facility]), Essential (primary) hypertension, GERD (gastroesophageal reflux disease), Heart murmur, Meningitis, Osteoporosis, Thyroid disease, or Tuberculosis.  Lynn has Acute tonsillitis due to other specified organisms; Moderate persistent asthma with exacerbation; and Shellfish allergy on their problem list.  Lynn has a past surgical history that includes  section, low transverse.  Her family history includes Asthma in her sister; Cancer in her mother; Diabetes in her father and mother; Seizure Disorder in her sister; Stroke in her father.  Lynn reports that she has never smoked. She has never used smokeless tobacco. She reports that she drinks about 0.6 oz of alcohol per week. She reports that she does not use drugs.  Lynn has a current medication list which includes the following prescription(s): prednisone, azithromycin, fluticasone, fluticasone, montelukast, and albuterol.  Lynn   Current Outpatient Medications   Medication Sig Dispense Refill   • predniSONE (DELTASONE) 20 MG tablet Take 1 tablet by mouth 2 times daily. 10 tablet 0   • azithromycin (ZITHROMAX) 250 MG tablet Take 2 tablets orally today followed by one daily for 4 days 6 tablet 0   • fluticasone (ARNUITY ELLIPTA) 100 MCG/ACT inhaler INHALE 1 PUFF DAILY.     • fluticasone (FLONASE) 50 MCG/ACT nasal spray 2 SPRAY IN EACH NOSTRIL DAILY      • montelukast (SINGULAIR) 10 MG tablet TAKE 1 TABLET BY MOUTH AT BEDTIME     • albuterol (VENTOLIN HFA) 108 (90 Base) MCG/ACT inhaler        No current facility-administered medications for this visit.      Lynn is allergic to no name available and shellfish allergy   (food or med).    Review of Systems   Constitutional: Negative for activity change, appetite change, fatigue, fever and unexpected weight change.   HENT: Negative for congestion, ear pain, hearing loss, nosebleeds, postnasal drip, sinus pain, sneezing, sore throat, tinnitus, trouble swallowing and voice change.    Eyes: Negative for pain and visual disturbance.   Respiratory: Negative for cough, shortness of breath and wheezing.    Cardiovascular: Negative for chest pain, palpitations and leg swelling.   Gastrointestinal: Negative for abdominal pain, blood in stool, constipation, diarrhea, nausea and vomiting.   Endocrine: Negative for cold intolerance, heat intolerance, polydipsia, polyphagia and polyuria.   Genitourinary: Negative for decreased urine volume, difficulty urinating, dysuria, frequency, hematuria and urgency.   Musculoskeletal: Negative for arthralgias, gait problem and myalgias.   Skin: Negative for color change and rash.   Allergic/Immunologic: Negative for environmental allergies and food allergies.   Neurological: Negative for dizziness, weakness, numbness and headaches.   Hematological: Negative for adenopathy. Does not bruise/bleed easily.   Psychiatric/Behavioral: Negative for behavioral problems, dysphoric mood, hallucinations and sleep disturbance. The patient is not nervous/anxious.        Objective   Physical Exam  Vitals:    06/06/19 1806   BP: 120/82   Pulse: 95   Resp: 20   Temp: 98.6 °F (37 °C)   TempSrc: Oral   SpO2: 98%   Weight: 125 kg (275 lb 11 oz)   Height: 5' 5\" (1.651 m)   PainSc:  0     Nursing note and vitals reviewed  Constitutional:  oriented to person, place, and time.  appears well-developed and  well-nourished.   HENT:   Head: Normocephalic and atraumatic.   Ears: External ears normal. No cerumen impaction, tympanic membrane normal  Nose: Nose normal.  No hypertrophy or edema of turbinates.  No sinus tenderness  Mouth/Throat: Erythematous tonsils without any exudate  Eyes: EOM are normal. Pupils are equal, round, and reactive to light.   Neck: Neck supple. No JVD present. No tracheal deviation present. No thyromegaly present.   Lungs: Clear with good air entry, no wheezes or rales.  Cardiovascular: Normal rate, regular rhythm and normal heart sounds. Exam reveals no gallop.   No murmur heard.  Abdomen: Not distended, no tenderness, no mass, bowel sounds normal.  Musculoskeletal: Normal range of motion. no extremity edema.   Neurological:  oriented to person, place, and time.   Speech normal , gait normal  No sensory or motor deficit noted   Skin: Skin is dry. No rash   Psychiatric:has a normal mood and affect.     Assessment   Problem List Items Addressed This Visit     Acute tonsillitis due to other specified organisms - Primary  Saline gargle  Acetaminophen PRN  Antibiotic as prescribed  Reporting case of worsening or any difficulty in swallowing    Moderate persistent asthma with exacerbation  ACT and asthma action plan discussed  Continue current medications  Add prednisone  Report in case of worsening  Go to the ER in case of worsening shortness of breath or wheezing    Relevant Medications    predniSONE (DELTASONE) 20 MG tablet    Other Relevant Orders    SERVICE TO ALLERGY & IMMUNOLOGY    Shellfish allergy  Allergy clinic referral given    Relevant Orders    SERVICE TO ALLERGY & IMMUNOLOGY

## 2022-07-03 DIAGNOSIS — K58.0 IRRITABLE BOWEL SYNDROME WITH DIARRHEA: ICD-10-CM

## 2022-07-04 RX ORDER — DICYCLOMINE HYDROCHLORIDE 10 MG/1
CAPSULE ORAL
Qty: 120 CAPSULE | Refills: 0 | Status: SHIPPED | OUTPATIENT
Start: 2022-07-04 | End: 2022-08-03

## 2022-07-19 ENCOUNTER — OFFICE VISIT (OUTPATIENT)
Dept: FAMILY MEDICINE CLINIC | Age: 87
End: 2022-07-19
Payer: COMMERCIAL

## 2022-07-19 ENCOUNTER — TELEPHONE (OUTPATIENT)
Dept: FAMILY MEDICINE CLINIC | Age: 87
End: 2022-07-19

## 2022-07-19 VITALS
DIASTOLIC BLOOD PRESSURE: 70 MMHG | BODY MASS INDEX: 20.84 KG/M2 | HEIGHT: 59 IN | WEIGHT: 103.4 LBS | SYSTOLIC BLOOD PRESSURE: 128 MMHG | OXYGEN SATURATION: 99 % | HEART RATE: 73 BPM

## 2022-07-19 DIAGNOSIS — G35 MULTIPLE SCLEROSIS (HCC): ICD-10-CM

## 2022-07-19 DIAGNOSIS — R53.82 CHRONIC FATIGUE: ICD-10-CM

## 2022-07-19 DIAGNOSIS — F33.41 RECURRENT MAJOR DEPRESSIVE DISORDER, IN PARTIAL REMISSION (HCC): ICD-10-CM

## 2022-07-19 DIAGNOSIS — I10 ESSENTIAL HYPERTENSION: ICD-10-CM

## 2022-07-19 DIAGNOSIS — G43.709 CHRONIC MIGRAINE WITHOUT AURA WITHOUT STATUS MIGRAINOSUS, NOT INTRACTABLE: Primary | ICD-10-CM

## 2022-07-19 DIAGNOSIS — M15.9 PRIMARY OSTEOARTHRITIS INVOLVING MULTIPLE JOINTS: ICD-10-CM

## 2022-07-19 DIAGNOSIS — R60.0 EDEMA OF BOTH LEGS: ICD-10-CM

## 2022-07-19 DIAGNOSIS — R53.1 WEAKNESS GENERALIZED: ICD-10-CM

## 2022-07-19 DIAGNOSIS — D50.8 IRON DEFICIENCY ANEMIA SECONDARY TO INADEQUATE DIETARY IRON INTAKE: ICD-10-CM

## 2022-07-19 DIAGNOSIS — E55.9 VITAMIN D DEFICIENCY: ICD-10-CM

## 2022-07-19 RX ORDER — FUROSEMIDE 20 MG/1
TABLET ORAL
COMMUNITY
Start: 2022-07-08

## 2022-07-19 RX ORDER — ERGOCALCIFEROL 1.25 MG/1
CAPSULE ORAL
COMMUNITY
Start: 2022-07-06

## 2022-07-19 SDOH — ECONOMIC STABILITY: FOOD INSECURITY: WITHIN THE PAST 12 MONTHS, YOU WORRIED THAT YOUR FOOD WOULD RUN OUT BEFORE YOU GOT MONEY TO BUY MORE.: NEVER TRUE

## 2022-07-19 SDOH — ECONOMIC STABILITY: FOOD INSECURITY: WITHIN THE PAST 12 MONTHS, THE FOOD YOU BOUGHT JUST DIDN'T LAST AND YOU DIDN'T HAVE MONEY TO GET MORE.: NEVER TRUE

## 2022-07-19 ASSESSMENT — PATIENT HEALTH QUESTIONNAIRE - PHQ9
SUM OF ALL RESPONSES TO PHQ QUESTIONS 1-9: 0
9. THOUGHTS THAT YOU WOULD BE BETTER OFF DEAD, OR OF HURTING YOURSELF: 0
6. FEELING BAD ABOUT YOURSELF - OR THAT YOU ARE A FAILURE OR HAVE LET YOURSELF OR YOUR FAMILY DOWN: 0
10. IF YOU CHECKED OFF ANY PROBLEMS, HOW DIFFICULT HAVE THESE PROBLEMS MADE IT FOR YOU TO DO YOUR WORK, TAKE CARE OF THINGS AT HOME, OR GET ALONG WITH OTHER PEOPLE: 0
SUM OF ALL RESPONSES TO PHQ QUESTIONS 1-9: 0
5. POOR APPETITE OR OVEREATING: 0
3. TROUBLE FALLING OR STAYING ASLEEP: 0
4. FEELING TIRED OR HAVING LITTLE ENERGY: 0
7. TROUBLE CONCENTRATING ON THINGS, SUCH AS READING THE NEWSPAPER OR WATCHING TELEVISION: 0
8. MOVING OR SPEAKING SO SLOWLY THAT OTHER PEOPLE COULD HAVE NOTICED. OR THE OPPOSITE, BEING SO FIGETY OR RESTLESS THAT YOU HAVE BEEN MOVING AROUND A LOT MORE THAN USUAL: 0
SUM OF ALL RESPONSES TO PHQ QUESTIONS 1-9: 0
2. FEELING DOWN, DEPRESSED OR HOPELESS: 0
SUM OF ALL RESPONSES TO PHQ QUESTIONS 1-9: 0

## 2022-07-19 ASSESSMENT — ENCOUNTER SYMPTOMS
COUGH: 0
SHORTNESS OF BREATH: 1
ABDOMINAL PAIN: 0
CONSTIPATION: 0
BACK PAIN: 0
CHEST TIGHTNESS: 0
BLOOD IN STOOL: 0
DIARRHEA: 0

## 2022-07-19 ASSESSMENT — SOCIAL DETERMINANTS OF HEALTH (SDOH): HOW HARD IS IT FOR YOU TO PAY FOR THE VERY BASICS LIKE FOOD, HOUSING, MEDICAL CARE, AND HEATING?: NOT HARD AT ALL

## 2022-07-19 NOTE — PROGRESS NOTES
disturbance        FAMILY HISTORY    Family History   Problem Relation Age of Onset    Cancer Mother     Glaucoma Mother     Other Mother 50        peritonitis    Diabetes Father     High Blood Pressure Father     Heart Disease Father     Ulcerative Colitis Father     Stroke Father        SOCIAL HISTORY    Social History     Socioeconomic History    Marital status:       Spouse name: None    Number of children: 2    Years of education: None    Highest education level: None   Tobacco Use    Smoking status: Former    Smokeless tobacco: Never    Tobacco comments:     40-50   Vaping Use    Vaping Use: Never used   Substance and Sexual Activity    Alcohol use: Yes     Comment: Socially    Drug use: No    Sexual activity: Not Currently     Partners: Male     Social Determinants of Health     Financial Resource Strain: Low Risk     Difficulty of Paying Living Expenses: Not hard at all   Food Insecurity: No Food Insecurity    Worried About [address] in the Last Year: Never true    [address] in the Last Year: Never true       SURGICAL HISTORY    Past Surgical History:   Procedure Laterality Date    APPENDECTOMY      COLONOSCOPY      EYE SURGERY      cataract    HERNIA REPAIR      HYSTERECTOMY (CERVIX STATUS UNKNOWN)      JOINT REPLACEMENT  2007    left shoulder    REVISION SHOULDER ARTHROPLASTY  2017    reverse shoulder, Dr. Codie Huber ENDOSCOPY         CURRENT MEDICATIONS    Current Outpatient Medications   Medication Sig Dispense Refill    vitamin D-3 (CHOLECALCIFEROL) 125 MCG (5000 UT) TABS Take 1,000 Int'l Units by mouth      vitamin D (ERGOCALCIFEROL) 1.25 MG (23924 UT) CAPS capsule       furosemide (LASIX) 20 MG tablet       Handicap Placard MISC by Does not apply route Expires 5 years from date of initiation 1 each 0    dicyclomine (BENTYL) 10 MG capsule TAKE ONE CAPSULE BY MOUTH FOUR TIMES A  capsule 0 butalbital-acetaminophen-caffeine (FIORICET, ESGIC) -40 MG per tablet Take 1 tablet by mouth every 4 hours as needed for Headaches 180 tablet 2    atenolol (TENORMIN) 25 MG tablet Take 1 tablet by mouth daily 30 tablet 5    doxepin (SINEQUAN) 25 MG capsule Take 2 capsules by mouth 2 times daily 120 capsule 3    hydroCHLOROthiazide (HYDRODIURIL) 12.5 MG tablet Take 1 tablet by mouth daily 30 tablet 5    valACYclovir (VALTREX) 500 MG tablet Take 1 tablet by mouth 2 times daily 30 tablet 5    simvastatin (ZOCOR) 10 MG tablet Take 1 tablet by mouth nightly 30 tablet 5    clindamycin (CLEOCIN) 300 MG capsule        No current facility-administered medications for this visit. ALLERGIES    Allergies   Allergen Reactions    Codeine     Levaquin [Levofloxacin In D5w]     Morphine     Novocain [Procaine]     Penicillins     Percocet [Oxycodone-Acetaminophen]     Sulfa Antibiotics     Vicodin [Hydrocodone-Acetaminophen]        PHYSICAL EXAM   Physical Exam  Vitals reviewed. Constitutional:       Appearance: She is normal weight. HENT:      Head: Normocephalic. Nose: Nose normal.      Mouth/Throat:      Mouth: Mucous membranes are moist.   Eyes:      Conjunctiva/sclera: Conjunctivae normal.      Pupils: Pupils are equal, round, and reactive to light. Cardiovascular:      Rate and Rhythm: Normal rate and regular rhythm. Heart sounds: Normal heart sounds. No murmur heard. Pulmonary:      Effort: Pulmonary effort is normal.      Breath sounds: Normal breath sounds. No wheezing or rales. Abdominal:      General: There is no distension. Palpations: Abdomen is soft. Tenderness: There is no abdominal tenderness. Musculoskeletal:         General: Normal range of motion. Cervical back: Normal range of motion. Right lower le+ Pitting Edema present. Left lower le+ Pitting Edema present. Skin:     General: Skin is warm and dry.    Neurological:      General: No focal deficit present. Mental Status: She is alert. Gait: Gait abnormal (weakness). Psychiatric:         Mood and Affect: Mood normal.         Behavior: Behavior normal.         Thought Content: Thought content normal.         Judgment: Judgment normal.       ASSESSMENT/PLAN  1. Chronic migraine without aura without status migrainosus, not intractable  Chronic, stable condition. Managed with Fioricet  - Handicap Placard MISC; by Does not apply route Expires 5 years from date of initiation  Dispense: 1 each; Refill: 0    2. Essential hypertension  Managed by cardiologist. Cont medication as prescribed     3. Chronic fatigue  Further labs ordered to eval for iron-deficiency anemia. Patient currently not taking iron supplement. Discuss supplementing if indicated   - Handicap Placard MISC; by Does not apply route Expires 5 years from date of initiation  Dispense: 1 each; Refill: 0    4. Multiple sclerosis (HCC)  Chronic, stable condition. Managed by neurologist   - Handicap Placard MISC; by Does not apply route Expires 5 years from date of initiation  Dispense: 1 each; Refill: 0    5. Primary osteoarthritis involving multiple joints  Chronic, stable condition. Cont activity level as tolerated. - Handicap Placard MISC; by Does not apply route Expires 5 years from date of initiation  Dispense: 1 each; Refill: 0    6. Weakness generalized  Chronic condition. Cont to monitor sx. Cont activity level as tolerated. 7. Iron deficiency anemia secondary to inadequate dietary iron intake  Labs ordered for further eval   - CBC with Auto Differential; Future  - Iron and TIBC; Future  - Ferritin; Future    8. Vitamin D deficiency  Cont supplement   - Vitamin D 25 Hydroxy; Future    9. Recurrent major depressive disorder, in partial remission (HCC)  Chronic, stable condition. Cont to monitor sx     10. Edema of both legs  Cont care with cardiologist. Currently taking Lasix.        Luis Enrique Ho received counseling on the following healthy behaviors: nutrition, exercise, and medication adherence  Reviewed prior labs and health maintenance. Continue current medications, diet and exercise. Discussed use, benefit, and side effects of prescribed medications. Barriers to medication compliance addressed. Patient given educational materials - see patient instructions. All patient questions answered. Patient voiced understanding. Return in about 6 months (around 1/19/2023) for htn. I have discussed the care of Cristina Guerra, including pertinent history and exam findings with the FNP student. I have reviewed the key elements of all parts of the encounter. I have seen and examined the patient with the student and the key elements of all parts of the encounter have been performed by me. I agree with the assessment, and status of the problem list as documented. The plan and orders should include   Orders Placed This Encounter   Procedures    CBC with Auto Differential    Vitamin D 25 Hydroxy    Iron and TIBC    Ferritin    and this was also documented. The medication list was reviewed and is up to date.      Author MD Jermaine      Electronically signed by Yanely Elizabeth on 7/19/22 at 11:42 AM EDT

## 2022-07-20 ENCOUNTER — CARE COORDINATION (OUTPATIENT)
Dept: CARE COORDINATION | Age: 87
End: 2022-07-20

## 2022-07-20 NOTE — CARE COORDINATION
Called pt and [facility] will [facility] and set up for occasional transport needs. She will call for any other needs.

## 2022-08-03 DIAGNOSIS — K58.0 IRRITABLE BOWEL SYNDROME WITH DIARRHEA: ICD-10-CM

## 2022-08-03 RX ORDER — DICYCLOMINE HYDROCHLORIDE 10 MG/1
CAPSULE ORAL
Qty: 120 CAPSULE | Refills: 0 | Status: SHIPPED | OUTPATIENT
Start: 2022-08-03 | End: 2022-09-06

## 2022-08-30 LAB
BASOPHILS ABSOLUTE: NORMAL
BASOPHILS RELATIVE PERCENT: NORMAL
EOSINOPHILS ABSOLUTE: NORMAL
EOSINOPHILS RELATIVE PERCENT: NORMAL
FERRITIN: NORMAL
HCT VFR BLD CALC: NORMAL %
HEMOGLOBIN: NORMAL
IRON: NORMAL
LYMPHOCYTES ABSOLUTE: NORMAL
LYMPHOCYTES RELATIVE PERCENT: NORMAL
MCH RBC QN AUTO: NORMAL PG
MCHC RBC AUTO-ENTMCNC: NORMAL G/DL
MCV RBC AUTO: NORMAL FL
MONOCYTES ABSOLUTE: NORMAL
MONOCYTES RELATIVE PERCENT: NORMAL
NEUTROPHILS ABSOLUTE: NORMAL
NEUTROPHILS RELATIVE PERCENT: NORMAL
PDW BLD-RTO: NORMAL %
PLATELET # BLD: NORMAL 10*3/UL
PMV BLD AUTO: NORMAL FL
RBC # BLD: NORMAL 10*6/UL
TOTAL IRON BINDING CAPACITY: NORMAL
VITAMIN D 25-HYDROXY: NORMAL
VITAMIN D2, 25 HYDROXY: NORMAL
VITAMIN D3,25 HYDROXY: NORMAL
WBC # BLD: NORMAL 10*3/UL

## 2022-09-01 DIAGNOSIS — E55.9 VITAMIN D DEFICIENCY: ICD-10-CM

## 2022-09-01 DIAGNOSIS — D50.8 IRON DEFICIENCY ANEMIA SECONDARY TO INADEQUATE DIETARY IRON INTAKE: ICD-10-CM

## 2022-09-02 ENCOUNTER — TELEPHONE (OUTPATIENT)
Dept: FAMILY MEDICINE CLINIC | Age: 87
End: 2022-09-02

## 2022-09-02 ENCOUNTER — CARE COORDINATION (OUTPATIENT)
Dept: CARE COORDINATION | Age: 87
End: 2022-09-02

## 2022-09-02 DIAGNOSIS — D64.9 ANEMIA, UNSPECIFIED TYPE: ICD-10-CM

## 2022-09-02 DIAGNOSIS — N18.32 STAGE 3B CHRONIC KIDNEY DISEASE (HCC): Primary | ICD-10-CM

## 2022-09-02 NOTE — TELEPHONE ENCOUNTER
Could you please call pt and/or family. She had labs done and anemia is worse. I don't see iron on her med list. I think she should see a hematologist. Also CKD is worse, I would like her to see nephrologist. If you could see if she will follow through with appts if made for her. She was having transportation issues previously. If you pend referrals that would be great. [facility] providers are fine unless she prefers to go elsewhere like [facility].

## 2022-09-02 NOTE — CARE COORDINATION
Ambulatory Care Coordination Note  9/2/2022    ACC: Kade Li, RN    Summary Note: spoke with pt who said she is doing ok at home. Did advise her of her pcp recommendations she would prefer using the [facility] for her providers as she has to depend on her daughter driving her. Will place referrals and follow up next week to assure the apt were made  1) acp   2) sdoh   3) med review  4) needs nephrology apt   5) needs hematology apt   6) has an apt with Dr Alejandra Owens mid sept    Lab Results       None            Care Coordination Interventions    Referral from Primary Care Provider: No  Suggested Interventions and Community Resources  Disease Specific Clinic: Completed  Transportation Support: Completed          Goals Addressed                   This Visit's Progress     Conditions and Symptoms        I will schedule office visits, as directed by my provider. I will keep my appointment or reschedule if I have to cancel. I will notify my provider of any barriers to my plan of care. Barriers: overwhelmed by complexity of regimen  Plan for overcoming my barriers: care coordination   Confidence: 8/10  Anticipated Goal Completion Date: 2/2/23                  Prior to Admission medications    Medication Sig Start Date End Date Taking?  Authorizing Provider   dicyclomine (BENTYL) 10 MG capsule TAKE ONE CAPSULE BY MOUTH FOUR TIMES A DAY 8/3/22   Severa Saba, MD   vitamin D-3 (CHOLECALCIFEROL) 125 MCG (5000 UT) TABS Take 1,000 Int'l Units by mouth 12/27/21   Historical Provider, MD   vitamin D (ERGOCALCIFEROL) 1.25 MG (47774 UT) CAPS capsule  7/6/22   Historical Provider, MD   furosemide (LASIX) 20 MG tablet  7/8/22   Historical Provider, MD   Handicap Placard MISC by Does not apply route Expires 5 years from date of initiation 7/19/22   Severa Saba, MD   butalbital-acetaminophen-caffeine (FIORICET, [facility]) -65 MG per tablet Take 1 tablet by mouth every 4 hours as needed for Headaches 11/4/21   Severa Saba, MD   atenolol (TENORMIN) 25 MG tablet Take 1 tablet by mouth daily 11/4/21   Kimberli Rice MD   [facility] MG capsule Take 2 capsules by mouth 2 times daily 10/1/21   Kimberli Rice MD   hydroCHLOROthiazide (HYDRODIURIL) 12.5 MG tablet Take 1 tablet by mouth daily 7/2/21   Kimberli Rice MD   valACYclovir (VALTREX) 500 MG tablet Take 1 tablet by mouth 2 times daily 5/20/21   Kimberli Rice MD   simvastatin (ZOCOR) 10 MG tablet Take 1 tablet by mouth nightly 9/23/19   Kimberli Rice MD   clindamycin (CLEOCIN) 300 MG capsule  1/19/17   Historical Provider, MD       Future Appointments   Date Time Provider Shabnam Steward   1/16/2023  1:45 PM Kimberli Rice MD [facility] Damien Mackenzie

## 2022-09-02 NOTE — CARE COORDINATION
Was asked by pcp to reach out to the pt called and left her a vm as well as her daughter madison to call acm back Statement Selected

## 2022-09-04 DIAGNOSIS — K58.0 IRRITABLE BOWEL SYNDROME WITH DIARRHEA: ICD-10-CM

## 2022-09-06 RX ORDER — DICYCLOMINE HYDROCHLORIDE 10 MG/1
CAPSULE ORAL
Qty: 120 CAPSULE | Refills: 0 | Status: SHIPPED | OUTPATIENT
Start: 2022-09-06 | End: 2022-10-03

## 2022-09-09 ENCOUNTER — CARE COORDINATION (OUTPATIENT)
Dept: CARE COORDINATION | Age: 87
End: 2022-09-09

## 2022-09-09 SDOH — ECONOMIC STABILITY: HOUSING INSECURITY: IN THE LAST 12 MONTHS, HOW MANY PLACES HAVE YOU LIVED?: 1

## 2022-09-09 SDOH — HEALTH STABILITY: PHYSICAL HEALTH: ON AVERAGE, HOW MANY DAYS PER WEEK DO YOU ENGAGE IN MODERATE TO STRENUOUS EXERCISE (LIKE A BRISK WALK)?: 0 DAYS

## 2022-09-09 SDOH — HEALTH STABILITY: PHYSICAL HEALTH: ON AVERAGE, HOW MANY MINUTES DO YOU ENGAGE IN EXERCISE AT THIS LEVEL?: 0 MIN

## 2022-09-09 SDOH — ECONOMIC STABILITY: INCOME INSECURITY: IN THE LAST 12 MONTHS, WAS THERE A TIME WHEN YOU WERE NOT ABLE TO PAY THE MORTGAGE OR RENT ON TIME?: NO

## 2022-09-09 SDOH — ECONOMIC STABILITY: HOUSING INSECURITY
IN THE LAST 12 MONTHS, WAS THERE A TIME WHEN YOU DID NOT HAVE A STEADY PLACE TO SLEEP OR SLEPT IN A SHELTER (INCLUDING NOW)?: NO

## 2022-09-09 ASSESSMENT — SOCIAL DETERMINANTS OF HEALTH (SDOH)
DO YOU BELONG TO ANY CLUBS OR ORGANIZATIONS SUCH AS CHURCH GROUPS UNIONS, FRATERNAL OR ATHLETIC GROUPS, OR SCHOOL GROUPS?: NO
HOW OFTEN DO YOU ATTEND CHURCH OR RELIGIOUS SERVICES?: MORE THAN 4 TIMES PER YEAR
HOW OFTEN DO YOU GET TOGETHER WITH FRIENDS OR RELATIVES?: MORE THAN THREE TIMES A WEEK
IN A TYPICAL WEEK, HOW MANY TIMES DO YOU TALK ON THE PHONE WITH FAMILY, FRIENDS, OR NEIGHBORS?: MORE THAN THREE TIMES A WEEK
HOW OFTEN DO YOU ATTENT MEETINGS OF THE CLUB OR ORGANIZATION YOU BELONG TO?: NEVER

## 2022-09-09 ASSESSMENT — LIFESTYLE VARIABLES
HOW OFTEN DO YOU HAVE A DRINK CONTAINING ALCOHOL: NEVER
HOW MANY STANDARD DRINKS CONTAINING ALCOHOL DO YOU HAVE ON A TYPICAL DAY: PATIENT DOES NOT DRINK

## 2022-09-09 NOTE — CARE COORDINATION
Ambulatory Care Coordination Note  9/9/2022    ACC: Jeremiah Huerta, RN    Summary Note: spoke with pt who said hematology did call her and her apt with them is sept 20th she did not hear yet from nephrology. Called nephrology and was told they did not get the referral will refax to them today. 1) acp done   2) sdoh done   3) med review  4) needs nephrology apt   5) [facility] hematology sept 20th   6) has an apt with Dr Sissy Alston mid sept    Lab Results       None            Care Coordination Interventions    Referral from Primary Care Provider: No  Suggested Interventions and Community Resources  Disease Specific Clinic: Completed  Transportation Support: Completed          Goals Addressed                   This Visit's Progress     Conditions and Symptoms   On track     I will schedule office visits, as directed by my provider. I will keep my appointment or reschedule if I have to cancel. I will notify my provider of any barriers to my plan of care. Barriers: overwhelmed by complexity of regimen  Plan for overcoming my barriers: care coordination   Confidence: 8/10  Anticipated Goal Completion Date: 2/2/23                  Prior to Admission medications    Medication Sig Start Date End Date Taking?  Authorizing Provider   dicyclomine (BENTYL) 10 MG capsule TAKE ONE CAPSULE BY MOUTH FOUR TIMES A DAY 9/6/22   Gal Vegas MD   vitamin D-3 (CHOLECALCIFEROL) 125 MCG (5000 UT) TABS Take 1,000 Int'l Units by mouth 12/27/21   Historical Provider, MD   vitamin D (ERGOCALCIFEROL) 1.25 MG (97565 UT) CAPS capsule  7/6/22   Historical Provider, MD   furosemide (LASIX) 20 MG tablet  7/8/22   Historical Provider, MD   Handicap Placard MISC by Does not apply route Expires 5 years from date of initiation 7/19/22   Gal Vegas MD   butalbital-acetaminophen-caffeine (FIORICET, [facility]GIC) -45 MG per tablet Take 1 tablet by mouth every 4 hours as needed for Headaches 11/4/21   Gal Vegas MD   atenolol (TENORMIN) 25 MG tablet Take 1 tablet by mouth daily 11/4/21   Gal Vegas MD   doxepin SET[facility]) 25 MG capsule Take 2 capsules by mouth 2 times daily 10/1/21   Gal Vegas MD   hydroCHLOROthiazide (HYDRODIURIL) 12.5 MG tablet Take 1 tablet by mouth daily 7/2/21   Gal Vegas MD   valACYclovir (VALTREX) 500 MG tablet Take 1 tablet by mouth 2 times daily 5/20/21   Gal Vegas MD   simvastatin (ZOCOR) 10 MG tablet Take 1 tablet by mouth nightly 9/23/19   Gal Vegas MD   clindamycin (CLEOCIN) 300 MG capsule  1/19/17   Historical Provider, MD       Future Appointments   Date Time Provider Shabnam Steward   1/16/2023  1:45 PM Gal Vegas MD [facility] [address] [facility]

## 2022-09-09 NOTE — ACP (ADVANCE CARE PLANNING)
Advance Care Planning   Healthcare Decision Maker:    Primary Decision Maker: Zofia Mcdonald Child - [phone]    Secondary Decision Maker: bashir farias - Child - [phone]    Click here to complete Healthcare Decision Makers including selection of the Healthcare Decision Maker Relationship (ie \"Primary\"). Today we documented Decision Maker(s) consistent with Legal Next of Kin hierarchy.

## 2022-09-14 ENCOUNTER — CARE COORDINATION (OUTPATIENT)
Dept: CARE COORDINATION | Age: 87
End: 2022-09-14

## 2022-09-14 NOTE — CARE COORDINATION
Patient contacted ACM for updates with recent Nephrology referral and scheduling appointment, was under the impression that Nephrology office was to call her this past Monday to schedule an NP appointment, ACM to contact nephrology office to assist with scheduling appointment

## 2022-09-22 ENCOUNTER — CARE COORDINATION (OUTPATIENT)
Dept: CARE COORDINATION | Age: 87
End: 2022-09-22

## 2022-09-22 NOTE — CARE COORDINATION
Attempted to reach pt for care coordination left vm to call [phone]
daily 11/4/21   Gen Owen MD   doxepin SETON [facility]) 25 MG capsule Take 2 capsules by mouth 2 times daily 10/1/21   Gen Owen MD   hydroCHLOROthiazide (HYDRODIURIL) 12.5 MG tablet Take 1 tablet by mouth daily 7/2/21   Gen Owen MD   valACYclovir (VALTREX) 500 MG tablet Take 1 tablet by mouth 2 times daily 5/20/21   Gen Owen MD   simvastatin (ZOCOR) 10 MG tablet Take 1 tablet by mouth nightly 9/23/19   Gen Owen MD   clindamycin (CLEOCIN) 300 MG capsule  1/19/17   Historical Provider, MD       Future Appointments   Date Time Provider Shabnam Steward   1/16/2023  1:45 PM Gen Owen MD [facility] Alireza Reusing

## 2022-10-02 DIAGNOSIS — K58.0 IRRITABLE BOWEL SYNDROME WITH DIARRHEA: ICD-10-CM

## 2022-10-03 RX ORDER — DICYCLOMINE HYDROCHLORIDE 10 MG/1
CAPSULE ORAL
Qty: 120 CAPSULE | Refills: 0 | Status: SHIPPED | OUTPATIENT
Start: 2022-10-03 | End: 2022-10-17

## 2022-10-06 ENCOUNTER — CARE COORDINATION (OUTPATIENT)
Dept: CARE COORDINATION | Age: 87
End: 2022-10-06

## 2022-10-13 ENCOUNTER — CARE COORDINATION (OUTPATIENT)
Dept: CARE COORDINATION | Age: 87
End: 2022-10-13

## 2022-10-16 DIAGNOSIS — I10 ESSENTIAL HYPERTENSION: ICD-10-CM

## 2022-10-16 DIAGNOSIS — K58.0 IRRITABLE BOWEL SYNDROME WITH DIARRHEA: ICD-10-CM

## 2022-10-17 RX ORDER — ATENOLOL 25 MG/1
TABLET ORAL
Qty: 30 TABLET | Refills: 5 | Status: SHIPPED | OUTPATIENT
Start: 2022-10-17

## 2022-10-17 RX ORDER — DICYCLOMINE HYDROCHLORIDE 10 MG/1
CAPSULE ORAL
Qty: 120 CAPSULE | Refills: 0 | Status: SHIPPED | OUTPATIENT
Start: 2022-10-17

## 2022-10-17 NOTE — TELEPHONE ENCOUNTER
Khadijah To is calling to request a refill on the following medication(s):    Last Visit Date (If Applicable):  4/62/3074    Next Visit Date:    1/16/2023    Medication Request:  Requested Prescriptions     Pending Prescriptions Disp Refills    atenolol (TENORMIN) 25 MG tablet [Pharmacy Med Name: ATENOLOL 25 MG TABLET] 30 tablet 5     Sig: TAKE ONE TABLET BY MOUTH DAILY    dicyclomine (BENTYL) 10 MG capsule [Pharmacy Med Name: DICYCLOMINE 10 MG CAPSULE] 120 capsule 0     Sig: TAKE ONE CAPSULE BY MOUTH FOUR TIMES A DAY

## 2022-10-20 ENCOUNTER — CARE COORDINATION (OUTPATIENT)
Dept: CARE COORDINATION | Age: 87
End: 2022-10-20

## 2022-10-21 ENCOUNTER — CARE COORDINATION (OUTPATIENT)
Dept: CARE COORDINATION | Age: 87
End: 2022-10-21

## 2022-10-27 ENCOUNTER — CARE COORDINATION (OUTPATIENT)
Dept: CARE COORDINATION | Age: 87
End: 2022-10-27

## 2022-11-29 ENCOUNTER — TELEPHONE (OUTPATIENT)
Dept: FAMILY MEDICINE CLINIC | Age: 87
End: 2022-11-29

## 2022-12-26 DIAGNOSIS — B00.9 HERPES: ICD-10-CM

## 2022-12-27 RX ORDER — VALACYCLOVIR HYDROCHLORIDE 500 MG/1
TABLET, FILM COATED ORAL
Qty: 30 TABLET | Refills: 5 | Status: SHIPPED | OUTPATIENT
Start: 2022-12-27

## 2022-12-29 ENCOUNTER — TELEPHONE (OUTPATIENT)
Dept: FAMILY MEDICINE CLINIC | Age: 87
End: 2022-12-29

## 2022-12-29 DIAGNOSIS — J20.9 ACUTE BRONCHITIS, UNSPECIFIED ORGANISM: Primary | ICD-10-CM

## 2022-12-29 RX ORDER — DOXYCYCLINE HYCLATE 100 MG
100 TABLET ORAL 2 TIMES DAILY
Qty: 14 TABLET | Refills: 0 | Status: SHIPPED | OUTPATIENT
Start: 2022-12-29 | End: 2023-01-05

## 2022-12-29 NOTE — TELEPHONE ENCOUNTER
Inform patient that Rx for antibiotic was [facility] to [facility] [address]. It is possible that this is a flu which will not respond to antibiotics. If she is worse she needs to go to urgent care or ED for testing.

## 2022-12-29 NOTE — TELEPHONE ENCOUNTER
Patient called in stating that since last Wednesday she has had cough, sore throat, low grade fever and headaches. She has tried advil for treatment and was getting better but the sickness came back. Covid test was negative, she wants to know if pcp can send in something for treatment.  Please advise

## 2023-01-16 ENCOUNTER — OFFICE VISIT (OUTPATIENT)
Dept: FAMILY MEDICINE CLINIC | Age: 88
End: 2023-01-16
Payer: MEDICARE

## 2023-01-16 VITALS
DIASTOLIC BLOOD PRESSURE: 68 MMHG | HEIGHT: 59 IN | HEART RATE: 64 BPM | BODY MASS INDEX: 21.97 KG/M2 | WEIGHT: 109 LBS | SYSTOLIC BLOOD PRESSURE: 128 MMHG

## 2023-01-16 DIAGNOSIS — G35 MULTIPLE SCLEROSIS (HCC): ICD-10-CM

## 2023-01-16 DIAGNOSIS — N18.32 STAGE 3B CHRONIC KIDNEY DISEASE (HCC): ICD-10-CM

## 2023-01-16 DIAGNOSIS — G43.709 CHRONIC MIGRAINE WITHOUT AURA WITHOUT STATUS MIGRAINOSUS, NOT INTRACTABLE: ICD-10-CM

## 2023-01-16 DIAGNOSIS — Z00.00 MEDICARE ANNUAL WELLNESS VISIT, SUBSEQUENT: Primary | ICD-10-CM

## 2023-01-16 DIAGNOSIS — I89.0 LYMPHEDEMA: ICD-10-CM

## 2023-01-16 DIAGNOSIS — I10 ESSENTIAL HYPERTENSION: ICD-10-CM

## 2023-01-16 DIAGNOSIS — F33.41 RECURRENT MAJOR DEPRESSIVE DISORDER, IN PARTIAL REMISSION (HCC): ICD-10-CM

## 2023-01-16 PROCEDURE — G8484 FLU IMMUNIZE NO ADMIN: HCPCS | Performed by: FAMILY MEDICINE

## 2023-01-16 PROCEDURE — 3078F DIAST BP <80 MM HG: CPT | Performed by: FAMILY MEDICINE

## 2023-01-16 PROCEDURE — 1123F ACP DISCUSS/DSCN MKR DOCD: CPT | Performed by: FAMILY MEDICINE

## 2023-01-16 PROCEDURE — 3074F SYST BP LT 130 MM HG: CPT | Performed by: FAMILY MEDICINE

## 2023-01-16 PROCEDURE — G0439 PPPS, SUBSEQ VISIT: HCPCS | Performed by: FAMILY MEDICINE

## 2023-01-16 ASSESSMENT — PATIENT HEALTH QUESTIONNAIRE - PHQ9
3. TROUBLE FALLING OR STAYING ASLEEP: 0
8. MOVING OR SPEAKING SO SLOWLY THAT OTHER PEOPLE COULD HAVE NOTICED. OR THE OPPOSITE, BEING SO FIGETY OR RESTLESS THAT YOU HAVE BEEN MOVING AROUND A LOT MORE THAN USUAL: 0
1. LITTLE INTEREST OR PLEASURE IN DOING THINGS: 2
4. FEELING TIRED OR HAVING LITTLE ENERGY: 0
SUM OF ALL RESPONSES TO PHQ QUESTIONS 1-9: 3
SUM OF ALL RESPONSES TO PHQ QUESTIONS 1-9: 3
10. IF YOU CHECKED OFF ANY PROBLEMS, HOW DIFFICULT HAVE THESE PROBLEMS MADE IT FOR YOU TO DO YOUR WORK, TAKE CARE OF THINGS AT HOME, OR GET ALONG WITH OTHER PEOPLE: 0
9. THOUGHTS THAT YOU WOULD BE BETTER OFF DEAD, OR OF HURTING YOURSELF: 0
6. FEELING BAD ABOUT YOURSELF - OR THAT YOU ARE A FAILURE OR HAVE LET YOURSELF OR YOUR FAMILY DOWN: 0
SUM OF ALL RESPONSES TO PHQ QUESTIONS 1-9: 3
5. POOR APPETITE OR OVEREATING: 0
SUM OF ALL RESPONSES TO PHQ9 QUESTIONS 1 & 2: 3
SUM OF ALL RESPONSES TO PHQ QUESTIONS 1-9: 3
7. TROUBLE CONCENTRATING ON THINGS, SUCH AS READING THE NEWSPAPER OR WATCHING TELEVISION: 0
2. FEELING DOWN, DEPRESSED OR HOPELESS: 1

## 2023-01-16 ASSESSMENT — ENCOUNTER SYMPTOMS
CONSTIPATION: 0
DIARRHEA: 0
EYES NEGATIVE: 1
ALLERGIC/IMMUNOLOGIC NEGATIVE: 1
SHORTNESS OF BREATH: 0
COUGH: 0
BLOOD IN STOOL: 0
ABDOMINAL PAIN: 0

## 2023-01-16 NOTE — PATIENT INSTRUCTIONS

## 2023-01-16 NOTE — PROGRESS NOTES
Medicare Annual Wellness Visit    Nichelle Delgado is here for Medicare AWV  Here with her daughter for AMW. Is concerned with rt leg swelling, has been seeing cardiologist. Has been told in the past it may be lymphedema. Unable to tolerate compression socks. Had a cologuard test done that was positive. Hx of colectomy for diverticulitis. Dr. Jose Koch did second surgery. She has an appt with Dr. Rafia Pinto, gastro, next week. Seeing oncologist for low iron. Has had iron infusion twice. Assessment & Plan   Medicare annual wellness visit, subsequent  Essential hypertension  Multiple sclerosis ([facility] [address]  Assessment & Plan:   Monitored by specialist- no acute findings meriting change in the plan  -use walker at home. Cont strengthening exercises  Recurrent major depressive disorder, in partial remission (HCC)  Assessment & Plan:   Well-controlled, continue current medications    Stage 3b chronic kidney disease ([facility] [address]  Assessment & Plan:   Well-controlled, continue current medications  -last labs normal.   Chronic migraine without aura without status migrainosus, not intractable  Lymphedema  -     External Referral To Lymphedema Clinic-[facility]      Recommendations for Preventive Services Due: see orders and patient instructions/AVS.  Recommended screening schedule for the next 5-10 years is provided to the patient in written form: see Patient Instructions/AVS.     Return in 6 months (on 7/16/2023) for Medicare Annual Wellness Visit in 1 year, fatigue. Subjective     Review of Systems   Constitutional:  Positive for fatigue. Negative for fever and unexpected weight change. HENT: Negative. Eyes: Negative. Respiratory:  Negative for cough and shortness of breath. Cardiovascular:  Negative for chest pain and leg swelling. Gastrointestinal:  Negative for abdominal pain, blood in stool, constipation and diarrhea. Dysphagia at times. Endocrine: Negative.     Genitourinary:  Negative for frequency and urgency. Musculoskeletal:  Positive for arthralgias. Skin: Negative. Allergic/Immunologic: Negative. Neurological:  Positive for weakness. Negative for dizziness and headaches. Hematological: Negative. Psychiatric/Behavioral:  Negative for sleep disturbance. The patient is not nervous/anxious. Patient's complete Health Risk Assessment and screening values have been reviewed and are found in Flowsheets. The following problems were reviewed today and where indicated follow up appointments were made and/or referrals ordered. Positive Risk Factor Screenings with Interventions:                 Weight and Activity:  Physical Activity: Inactive    Days of Exercise per Week: 0 days    Minutes of Exercise per Session: 0 min           Body mass index: 22.01      Inactivity Interventions:  Patient comments: stay active at home, use walker                           Objective   Vitals:    23 1356   BP: 128/68   Pulse: 64   Weight: 109 lb (49.4 kg)   Height: 4' 11\" (1.499 m)      Body mass index is 22.02 kg/m². Physical Exam  Vitals reviewed. Constitutional:       Appearance: She is well-developed. HENT:      Head: Normocephalic. Eyes:      Pupils: Pupils are equal, round, and reactive to light. Neck:      Thyroid: No thyromegaly. Cardiovascular:      Rate and Rhythm: Normal rate and regular rhythm. Heart sounds: Normal heart sounds. No murmur heard. Pulmonary:      Effort: Pulmonary effort is normal.      Breath sounds: Normal breath sounds. No wheezing or rales. Abdominal:      Palpations: Abdomen is soft. Tenderness: There is no abdominal tenderness. There is no guarding or rebound. Musculoskeletal:         General: Swelling (rt leg) present. No tenderness or deformity. Normal range of motion. Cervical back: Normal range of motion and neck supple. Right lower le+ Edema (tender along lower leg) present.       Left lower le+ Edema present. Lymphadenopathy:      Cervical: No cervical adenopathy. Skin:     General: Skin is warm and dry. Neurological:      Mental Status: She is alert and oriented to person, place, and time. Motor: Weakness present. Gait: Gait abnormal (needs assistance from daughter). Psychiatric:         Mood and Affect: Mood normal.         Behavior: Behavior normal.         Thought Content: Thought content normal.         Judgment: Judgment normal.           Allergies   Allergen Reactions    Codeine     Levaquin [Levofloxacin In D5w]     Morphine     Novocain [Procaine]     Penicillins     Percocet [Oxycodone-Acetaminophen]     Sulfa Antibiotics     Vicodin [Hydrocodone-Acetaminophen]      Prior to Visit Medications    Medication Sig Taking?  Authorizing Provider   valACYclovir (VALTREX) 500 MG tablet TAKE ONE TABLET BY MOUTH TWICE A DAY Yes Guillermo Barros MD   atenolol (TENORMIN) 25 MG tablet TAKE ONE TABLET BY MOUTH DAILY Yes Guillermo Barros MD   dicyclomine (BENTYL) 10 MG capsule TAKE ONE CAPSULE BY MOUTH FOUR TIMES A DAY Yes Guillermo Barros MD   vitamin D-3 (CHOLECALCIFEROL) 125 MCG (5000 UT) TABS Take 1,000 Int'l Units by mouth Yes Historical Provider, MD   vitamin D (ERGOCALCIFEROL) 1.25 MG (22392 UT) CAPS capsule  Yes Historical Provider, MD   furosemide (LASIX) 20 MG tablet  Yes Historical Provider, MD   Handicap Placard MISC by Does not apply route Expires 5 years from date of initiation Yes Guillermo Barros MD   butalbital-acetaminophen-caffeine (FIORICET, ESGIC) -40 MG per tablet Take 1 tablet by mouth every 4 hours as needed for Headaches Yes Guillermo Barros MD   doxepin (SINEQUAN) 25 MG capsule Take 2 capsules by mouth 2 times daily Yes Guillermo Barros MD   hydroCHLOROthiazide (HYDRODIURIL) 12.5 MG tablet Take 1 tablet by mouth daily Yes Guillermo Barros MD   simvastatin (ZOCOR) 10 MG tablet Take 1 tablet by mouth nightly Yes Guillermo Barros MD   clindamycin (CLEOCIN) 300 MG capsule  Yes Historical Provider, MD Khan (Including outside providers/suppliers regularly involved in providing care):   Patient Care Team:  Dioni Woodward MD as PCP - General (Family Medicine)  Dioni Woodward MD as PCP - [facility] Empaneled Provider     Reviewed and updated this visit:  Tobacco  Allergies  Meds  Med Hx  Surg Hx  Soc Hx  Fam Hx

## 2023-01-23 DIAGNOSIS — K58.0 IRRITABLE BOWEL SYNDROME WITH DIARRHEA: ICD-10-CM

## 2023-01-23 RX ORDER — DICYCLOMINE HYDROCHLORIDE 10 MG/1
CAPSULE ORAL
Qty: 120 CAPSULE | Refills: 0 | Status: SHIPPED | OUTPATIENT
Start: 2023-01-23

## 2023-03-02 ENCOUNTER — TELEPHONE (OUTPATIENT)
Dept: FAMILY MEDICINE CLINIC | Age: 88
End: 2023-03-02

## 2023-03-02 NOTE — TELEPHONE ENCOUNTER
Patient is having c/o sore throat, sneezing, cough with clear phelgm x 2 days. Patient is not concerned for covid and has not tried anything OTC. Pharmacy has been updated in chart.

## 2023-05-11 DIAGNOSIS — I10 ESSENTIAL HYPERTENSION: ICD-10-CM

## 2023-05-11 RX ORDER — ATENOLOL 25 MG/1
TABLET ORAL
Qty: 90 TABLET | Refills: 1 | Status: SHIPPED | OUTPATIENT
Start: 2023-05-11

## 2023-06-30 ENCOUNTER — TELEPHONE (OUTPATIENT)
Dept: FAMILY MEDICINE CLINIC | Age: 88
End: 2023-06-30

## 2023-07-03 ENCOUNTER — TELEPHONE (OUTPATIENT)
Dept: FAMILY MEDICINE CLINIC | Age: 88
End: 2023-07-03

## 2023-07-03 NOTE — TELEPHONE ENCOUNTER
----- Message from Nahum Dela Cruz sent at 6/30/2023 11:11 AM EDT -----  Subject: Appointment Request    Reason for Call: Established Patient Appointment needed: Routine Existing   Condition Follow Up    QUESTIONS    Reason for appointment request? Available appointments did not meet   patient need     Additional Information for Provider? patient was being treated for   Lymphadema for about 2 months and is not getting any better-she needs to   be seen asap with [facility].  I saw no appts until August. Would like a Monday   or Friday  ---------------------------------------------------------------------------  --------------  Sergei VAUGHN  [phone]; OK to leave message on voicemail  ---------------------------------------------------------------------------  --------------  SCRIPT ANSWERS

## 2023-07-07 ENCOUNTER — OFFICE VISIT (OUTPATIENT)
Dept: FAMILY MEDICINE CLINIC | Age: 88
End: 2023-07-07
Payer: MEDICARE

## 2023-07-07 ENCOUNTER — HOSPITAL ENCOUNTER (OUTPATIENT)
Age: 88
Setting detail: SPECIMEN
Discharge: HOME | End: 2023-07-07

## 2023-07-07 VITALS
BODY MASS INDEX: 22.7 KG/M2 | WEIGHT: 112.4 LBS | SYSTOLIC BLOOD PRESSURE: 114 MMHG | DIASTOLIC BLOOD PRESSURE: 78 MMHG | HEART RATE: 104 BPM

## 2023-07-07 DIAGNOSIS — M25.551 PAIN OF RIGHT HIP: ICD-10-CM

## 2023-07-07 DIAGNOSIS — I10 ESSENTIAL HYPERTENSION: Primary | ICD-10-CM

## 2023-07-07 DIAGNOSIS — N18.32 STAGE 3B CHRONIC KIDNEY DISEASE (HCC): ICD-10-CM

## 2023-07-07 DIAGNOSIS — E55.9 VITAMIN D DEFICIENCY: ICD-10-CM

## 2023-07-07 DIAGNOSIS — G35 MULTIPLE SCLEROSIS (HCC): ICD-10-CM

## 2023-07-07 DIAGNOSIS — D50.8 IRON DEFICIENCY ANEMIA SECONDARY TO INADEQUATE DIETARY IRON INTAKE: ICD-10-CM

## 2023-07-07 DIAGNOSIS — I89.0 LYMPHEDEMA: ICD-10-CM

## 2023-07-07 DIAGNOSIS — G89.29 CHRONIC RIGHT-SIDED LOW BACK PAIN WITHOUT SCIATICA: ICD-10-CM

## 2023-07-07 DIAGNOSIS — R19.8 INCREASED ABDOMINAL GIRTH: ICD-10-CM

## 2023-07-07 DIAGNOSIS — G43.709 CHRONIC MIGRAINE WITHOUT AURA WITHOUT STATUS MIGRAINOSUS, NOT INTRACTABLE: ICD-10-CM

## 2023-07-07 DIAGNOSIS — M54.50 CHRONIC RIGHT-SIDED LOW BACK PAIN WITHOUT SCIATICA: ICD-10-CM

## 2023-07-07 LAB
25(OH)D3 SERPL-MCNC: 29.6 NG/ML
ALBUMIN SERPL-MCNC: 4.2 G/DL (ref 3.5–5.2)
ALBUMIN/GLOB SERPL: 1.7 {RATIO} (ref 1–2.5)
ALP SERPL-CCNC: 138 U/L (ref 35–104)
ALT SERPL-CCNC: 13 U/L (ref 5–33)
ANION GAP SERPL CALCULATED.3IONS-SCNC: 15 MMOL/L (ref 9–17)
AST SERPL-CCNC: 21 U/L
BASOPHILS # BLD: 0.07 K/UL (ref 0–0.2)
BASOPHILS NFR BLD: 1 % (ref 0–2)
BILIRUB SERPL-MCNC: 0.3 MG/DL (ref 0.3–1.2)
BUN SERPL-MCNC: 22 MG/DL (ref 8–23)
CALCIUM SERPL-MCNC: 9.8 MG/DL (ref 8.6–10.4)
CHLORIDE SERPL-SCNC: 100 MMOL/L (ref 98–107)
CO2 SERPL-SCNC: 21 MMOL/L (ref 20–31)
CREAT SERPL-MCNC: 0.88 MG/DL (ref 0.5–0.9)
EOSINOPHIL # BLD: 0.12 K/UL (ref 0–0.44)
EOSINOPHILS RELATIVE PERCENT: 2 % (ref 1–4)
ERYTHROCYTE [DISTWIDTH] IN BLOOD BY AUTOMATED COUNT: 14.1 % (ref 11.8–14.4)
GFR SERPL CREATININE-BSD FRML MDRD: >60 ML/MIN/1.73M2
GLUCOSE SERPL-MCNC: 133 MG/DL (ref 70–99)
HCT VFR BLD AUTO: 38.2 % (ref 36.3–47.1)
HGB BLD-MCNC: 12.1 G/DL (ref 11.9–15.1)
IMM GRANULOCYTES # BLD AUTO: <0.03 K/UL (ref 0–0.3)
IMM GRANULOCYTES NFR BLD: 0 %
LYMPHOCYTES # BLD: 17 % (ref 24–43)
LYMPHOCYTES NFR BLD: 1.37 K/UL (ref 1.1–3.7)
MCH RBC QN AUTO: 29.4 PG (ref 25.2–33.5)
MCHC RBC AUTO-ENTMCNC: 31.7 G/DL (ref 28.4–34.8)
MCV RBC AUTO: 92.9 FL (ref 82.6–102.9)
MONOCYTES NFR BLD: 0.53 K/UL (ref 0.1–1.2)
MONOCYTES NFR BLD: 7 % (ref 3–12)
NEUTROPHILS NFR BLD: 73 % (ref 36–65)
NEUTS SEG NFR BLD: 5.81 K/UL (ref 1.5–8.1)
NRBC BLD-RTO: 0 PER 100 WBC
PLATELET # BLD AUTO: 263 K/UL (ref 138–453)
PMV BLD AUTO: 9.1 FL (ref 8.1–13.5)
POTASSIUM SERPL-SCNC: 4.2 MMOL/L (ref 3.7–5.3)
PROT SERPL-MCNC: 6.7 G/DL (ref 6.4–8.3)
RBC # BLD AUTO: 4.11 M/UL (ref 3.95–5.11)
SODIUM SERPL-SCNC: 136 MMOL/L (ref 135–144)
WBC OTHER # BLD: 7.9 K/UL (ref 3.5–11.3)

## 2023-07-07 PROCEDURE — G8420 CALC BMI NORM PARAMETERS: HCPCS | Performed by: FAMILY MEDICINE

## 2023-07-07 PROCEDURE — 1123F ACP DISCUSS/DSCN MKR DOCD: CPT | Performed by: FAMILY MEDICINE

## 2023-07-07 PROCEDURE — 99214 OFFICE O/P EST MOD 30 MIN: CPT | Performed by: FAMILY MEDICINE

## 2023-07-07 PROCEDURE — 1090F PRES/ABSN URINE INCON ASSESS: CPT | Performed by: FAMILY MEDICINE

## 2023-07-07 PROCEDURE — 1036F TOBACCO NON-USER: CPT | Performed by: FAMILY MEDICINE

## 2023-07-07 PROCEDURE — G8427 DOCREV CUR MEDS BY ELIG CLIN: HCPCS | Performed by: FAMILY MEDICINE

## 2023-07-07 ASSESSMENT — ENCOUNTER SYMPTOMS
ABDOMINAL PAIN: 0
DIARRHEA: 1
COUGH: 0
BLOOD IN STOOL: 0
CONSTIPATION: 0
SHORTNESS OF BREATH: 1
EYES NEGATIVE: 1
ALLERGIC/IMMUNOLOGIC NEGATIVE: 1

## 2023-07-07 NOTE — PROGRESS NOTES
Vicodin [Hydrocodone-Acetaminophen]        PHYSICAL EXAM   Physical Exam  Vitals reviewed. Constitutional:       Appearance: She is well-developed. HENT:      Head: Normocephalic. Eyes:      Pupils: Pupils are equal, round, and reactive to light. Neck:      Thyroid: No thyromegaly. Cardiovascular:      Rate and Rhythm: Normal rate and regular rhythm. Heart sounds: Normal heart sounds. No murmur heard. Comments: Edema of ankles  Pulmonary:      Effort: Pulmonary effort is normal.      Breath sounds: Normal breath sounds. No wheezing or rales. Abdominal:      Palpations: Abdomen is soft. Tenderness: There is abdominal tenderness (mild, diffuse). There is no guarding or rebound. Musculoskeletal:         General: Tenderness (rt lower back and lateral hip) and deformity (oa changes) present. Normal range of motion. Cervical back: Normal range of motion and neck supple. Right lower le+ Edema present. Left lower le+ Edema present. Lymphadenopathy:      Cervical: No cervical adenopathy. Skin:     General: Skin is warm and dry. Neurological:      Mental Status: She is alert and oriented to person, place, and time. Psychiatric:         Mood and Affect: Mood normal.         Behavior: Behavior normal.         Thought Content: Thought content normal.         Judgment: Judgment normal.       ASSESSMENT/PLAN  1. Essential hypertension  Chronic and stable, cont same meds, follow with cardiologist    2. Multiple sclerosis (HCC)  Unchanged. Follow with neurologist    3. Stage 3b chronic kidney disease (HCC)  Chronic and stable. Following with nephrologist  - Comprehensive Metabolic Panel; Future    4. Lymphedema  Refer to vascular specialist.  Continue compression socks or wraps if helpful. - CBC with Auto Differential; Future  - CT ABDOMEN PELVIS W IV CONTRAST Additional Contrast? Radiologist Recommendation;  Future  - William Martin MD, Vascular Surgery, [facility]

## 2023-07-10 DIAGNOSIS — E55.9 VITAMIN D DEFICIENCY: Primary | ICD-10-CM

## 2023-07-10 DIAGNOSIS — M25.551 PAIN OF RIGHT HIP: ICD-10-CM

## 2023-07-10 DIAGNOSIS — M54.50 CHRONIC RIGHT-SIDED LOW BACK PAIN WITHOUT SCIATICA: ICD-10-CM

## 2023-07-10 DIAGNOSIS — G89.29 CHRONIC RIGHT-SIDED LOW BACK PAIN WITHOUT SCIATICA: ICD-10-CM

## 2023-07-20 ENCOUNTER — TELEPHONE (OUTPATIENT)
Dept: VASCULAR SURGERY | Age: 88
End: 2023-07-20

## 2023-07-20 NOTE — TELEPHONE ENCOUNTER
Received referral for patient to be evaluated for lymphedema. I called and spoke to the patient who states she will have her daughter call the office to schedule because she does not drive and her daughter takes her to her appointments.

## 2023-08-08 ENCOUNTER — HOSPITAL ENCOUNTER (OUTPATIENT)
Dept: CT IMAGING | Age: 88
Discharge: HOME | End: 2023-08-10
Payer: MEDICARE

## 2023-08-08 ENCOUNTER — HOSPITAL ENCOUNTER (OUTPATIENT)
Dept: INTERVENTIONAL RADIOLOGY/VASCULAR | Age: 88
Discharge: HOME | End: 2023-08-10
Payer: MEDICARE

## 2023-08-08 DIAGNOSIS — R19.8 INCREASED ABDOMINAL GIRTH: ICD-10-CM

## 2023-08-08 DIAGNOSIS — I89.0 LYMPHEDEMA: ICD-10-CM

## 2023-08-08 PROCEDURE — 6360000004 HC RX CONTRAST MEDICATION: Performed by: FAMILY MEDICINE

## 2023-08-08 PROCEDURE — 2500000003 HC RX 250 WO HCPCS: Performed by: FAMILY MEDICINE

## 2023-08-08 PROCEDURE — 74177 CT ABD & PELVIS W/CONTRAST: CPT

## 2023-08-08 PROCEDURE — 2580000003 HC RX 258: Performed by: FAMILY MEDICINE

## 2023-08-08 RX ORDER — 0.9 % SODIUM CHLORIDE 0.9 %
80 INTRAVENOUS SOLUTION INTRAVENOUS ONCE
Status: COMPLETED | OUTPATIENT
Start: 2023-08-08 | End: 2023-08-08

## 2023-08-08 RX ORDER — SODIUM CHLORIDE 0.9 % (FLUSH) 0.9 %
10 SYRINGE (ML) INJECTION PRN
Status: DISCONTINUED | OUTPATIENT
Start: 2023-08-08 | End: 2023-08-11 | Stop reason: HOSPADM

## 2023-08-08 RX ADMIN — SODIUM CHLORIDE, PRESERVATIVE FREE 10 ML: 5 INJECTION INTRAVENOUS at 10:57

## 2023-08-08 RX ADMIN — BARIUM SULFATE 450 ML: 20 SUSPENSION ORAL at 10:56

## 2023-08-08 RX ADMIN — IOPAMIDOL 75 ML: 755 INJECTION, SOLUTION INTRAVENOUS at 10:58

## 2023-08-08 RX ADMIN — SODIUM CHLORIDE 80 ML: 9 INJECTION, SOLUTION INTRAVENOUS at 10:56

## 2023-08-30 ENCOUNTER — TELEPHONE (OUTPATIENT)
Dept: FAMILY MEDICINE CLINIC | Age: 88
End: 2023-08-30

## 2023-09-18 ENCOUNTER — OFFICE VISIT (OUTPATIENT)
Dept: VASCULAR SURGERY | Age: 88
End: 2023-09-18
Payer: MEDICARE

## 2023-09-18 VITALS
RESPIRATION RATE: 18 BRPM | BODY MASS INDEX: 23.39 KG/M2 | HEART RATE: 118 BPM | OXYGEN SATURATION: 94 % | DIASTOLIC BLOOD PRESSURE: 80 MMHG | SYSTOLIC BLOOD PRESSURE: 145 MMHG | HEIGHT: 59 IN | WEIGHT: 116 LBS

## 2023-09-18 DIAGNOSIS — I89.0 LYMPHEDEMA OF BOTH LOWER EXTREMITIES: ICD-10-CM

## 2023-09-18 PROCEDURE — 1123F ACP DISCUSS/DSCN MKR DOCD: CPT | Performed by: SURGERY

## 2023-09-18 PROCEDURE — G8427 DOCREV CUR MEDS BY ELIG CLIN: HCPCS | Performed by: SURGERY

## 2023-09-18 PROCEDURE — 1090F PRES/ABSN URINE INCON ASSESS: CPT | Performed by: SURGERY

## 2023-09-18 PROCEDURE — G8420 CALC BMI NORM PARAMETERS: HCPCS | Performed by: SURGERY

## 2023-09-18 PROCEDURE — 1036F TOBACCO NON-USER: CPT | Performed by: SURGERY

## 2023-09-18 PROCEDURE — 99203 OFFICE O/P NEW LOW 30 MIN: CPT | Performed by: SURGERY

## 2023-09-18 NOTE — PROGRESS NOTES
[address] [facility] [place]  [address] [facility] [id number]  [address] [facility]  1935  82 y. o.female       Chief Complaint:  Chief Complaint   Patient presents with    New Patient     New patient evaluation for lymphedema       History of present Illness:  Pt is here today for evaluation and treatment of lower extremity lymphedema. This is an 82-year-old female with bilateral lower extremity lymphedema. Her daughter is concerned because she has gained 16 pounds and the edema appears to be to her waist level. She has been to the [facility] lymphedema clinic as recently as May of this year. We spent time discussing her lymphedema in general.  She does not have any secondary signs of lymphedema. There is no thickening of the skin and no wound formation and no bouts of cellulitis. She has multiple comorbidities which also contribute to the swelling. She has both aortic and mitral valve problems and she is being followed by cardiology. Is notable that she does have a decrease in her overall mobility and ability to walk. There is concerned that she is a fall risk and I understand this. She is currently wearing knee-high compression wraps and I think she needs to continue wearing this is much as possible. Although she has worsening swelling in the legs I think this is a consequence of her overall activity. She is able to sleep supine in the bed but increasing activity should help with the edema. All this is while minimizing her fall risk.     Past Medical History:  has a past medical history of Allergy, Anemia, Anxiety, Depression, Diverticulitis, Dizziness, Fracture, Gait disturbance, Genital herpes, GERD (gastroesophageal reflux disease), Headache, Hyperlipidemia, Hypertension, Multiple sclerosis ([address]), Osteoarthritis, Osteoporosis, Parkinsons ([address]), Stage 3b chronic kidney disease ([address]), Swallowing impairment, and Vision

## 2023-09-21 DIAGNOSIS — I10 ESSENTIAL HYPERTENSION: ICD-10-CM

## 2023-09-21 RX ORDER — ATENOLOL 25 MG/1
25 TABLET ORAL DAILY
Qty: 90 TABLET | Refills: 1 | Status: SHIPPED | OUTPATIENT
Start: 2023-09-21

## 2023-09-21 NOTE — TELEPHONE ENCOUNTER
Annie Sanchez is calling to request a refill on the following medication(s):    Last Visit Date (If Applicable):  0/7/2194    Next Visit Date:    11/6/2023    Medication Request:  Requested Prescriptions     Pending Prescriptions Disp Refills    atenolol (TENORMIN) 25 MG tablet 90 tablet 1     Sig: Take 1 tablet by mouth daily

## 2023-10-30 DIAGNOSIS — B00.9 HERPES: ICD-10-CM

## 2023-10-31 RX ORDER — VALACYCLOVIR HYDROCHLORIDE 500 MG/1
500 TABLET, FILM COATED ORAL 2 TIMES DAILY
Qty: 30 TABLET | Refills: 5 | Status: SHIPPED | OUTPATIENT
Start: 2023-10-31

## 2023-11-06 ENCOUNTER — HOSPITAL ENCOUNTER (OUTPATIENT)
Age: 88
Setting detail: SPECIMEN
Discharge: HOME | End: 2023-11-06

## 2023-11-06 ENCOUNTER — OFFICE VISIT (OUTPATIENT)
Dept: FAMILY MEDICINE CLINIC | Age: 88
End: 2023-11-06
Payer: MEDICARE

## 2023-11-06 VITALS
SYSTOLIC BLOOD PRESSURE: 128 MMHG | WEIGHT: 114.6 LBS | BODY MASS INDEX: 23.15 KG/M2 | DIASTOLIC BLOOD PRESSURE: 70 MMHG | HEART RATE: 74 BPM

## 2023-11-06 DIAGNOSIS — R73.09 ABNORMAL GLUCOSE: ICD-10-CM

## 2023-11-06 DIAGNOSIS — Z13.1 ENCOUNTER FOR SCREENING FOR DIABETES MELLITUS: ICD-10-CM

## 2023-11-06 DIAGNOSIS — Z23 NEED FOR INFLUENZA VACCINATION: ICD-10-CM

## 2023-11-06 DIAGNOSIS — I38 HEART VALVE DISORDER: ICD-10-CM

## 2023-11-06 DIAGNOSIS — E78.2 MIXED HYPERLIPIDEMIA: ICD-10-CM

## 2023-11-06 DIAGNOSIS — R60.0 EDEMA OF BOTH LEGS: ICD-10-CM

## 2023-11-06 DIAGNOSIS — G35 MULTIPLE SCLEROSIS (HCC): ICD-10-CM

## 2023-11-06 DIAGNOSIS — E55.9 VITAMIN D DEFICIENCY: ICD-10-CM

## 2023-11-06 DIAGNOSIS — I10 ESSENTIAL HYPERTENSION: Primary | ICD-10-CM

## 2023-11-06 DIAGNOSIS — R53.82 CHRONIC FATIGUE: ICD-10-CM

## 2023-11-06 LAB
CHOLEST SERPL-MCNC: 194 MG/DL
CHOLESTEROL/HDL RATIO: 2.5
GLUCOSE P FAST SERPL-MCNC: 98 MG/DL (ref 70–99)
HDLC SERPL-MCNC: 77 MG/DL
LDLC SERPL CALC-MCNC: 74 MG/DL (ref 0–130)
TRIGL SERPL-MCNC: 216 MG/DL

## 2023-11-06 PROCEDURE — G0008 ADMIN INFLUENZA VIRUS VAC: HCPCS | Performed by: FAMILY MEDICINE

## 2023-11-06 PROCEDURE — 1036F TOBACCO NON-USER: CPT | Performed by: FAMILY MEDICINE

## 2023-11-06 PROCEDURE — 99214 OFFICE O/P EST MOD 30 MIN: CPT | Performed by: FAMILY MEDICINE

## 2023-11-06 PROCEDURE — G8484 FLU IMMUNIZE NO ADMIN: HCPCS | Performed by: FAMILY MEDICINE

## 2023-11-06 PROCEDURE — G8427 DOCREV CUR MEDS BY ELIG CLIN: HCPCS | Performed by: FAMILY MEDICINE

## 2023-11-06 PROCEDURE — 90694 VACC AIIV4 NO PRSRV 0.5ML IM: CPT | Performed by: FAMILY MEDICINE

## 2023-11-06 PROCEDURE — 1090F PRES/ABSN URINE INCON ASSESS: CPT | Performed by: FAMILY MEDICINE

## 2023-11-06 PROCEDURE — G8420 CALC BMI NORM PARAMETERS: HCPCS | Performed by: FAMILY MEDICINE

## 2023-11-06 PROCEDURE — 1123F ACP DISCUSS/DSCN MKR DOCD: CPT | Performed by: FAMILY MEDICINE

## 2023-11-06 SDOH — ECONOMIC STABILITY: FOOD INSECURITY: WITHIN THE PAST 12 MONTHS, THE FOOD YOU BOUGHT JUST DIDN'T LAST AND YOU DIDN'T HAVE MONEY TO GET MORE.: NEVER TRUE

## 2023-11-06 SDOH — ECONOMIC STABILITY: FOOD INSECURITY: WITHIN THE PAST 12 MONTHS, YOU WORRIED THAT YOUR FOOD WOULD RUN OUT BEFORE YOU GOT MONEY TO BUY MORE.: NEVER TRUE

## 2023-11-06 SDOH — ECONOMIC STABILITY: INCOME INSECURITY: HOW HARD IS IT FOR YOU TO PAY FOR THE VERY BASICS LIKE FOOD, HOUSING, MEDICAL CARE, AND HEATING?: NOT HARD AT ALL

## 2023-11-06 ASSESSMENT — ENCOUNTER SYMPTOMS
EYES NEGATIVE: 1
SHORTNESS OF BREATH: 1
CONSTIPATION: 0
BLOOD IN STOOL: 0
COUGH: 0
DIARRHEA: 0
ALLERGIC/IMMUNOLOGIC NEGATIVE: 1
ABDOMINAL PAIN: 0

## 2023-11-06 NOTE — PROGRESS NOTES
Percocet [Oxycodone-Acetaminophen]     Sulfa Antibiotics     Vicodin [Hydrocodone-Acetaminophen]        PHYSICAL EXAM   Physical Exam  Vitals reviewed. Constitutional:       Appearance: She is well-developed. HENT:      Head: Normocephalic. Eyes:      Pupils: Pupils are equal, round, and reactive to light. Neck:      Thyroid: No thyromegaly. Cardiovascular:      Rate and Rhythm: Normal rate and regular rhythm. Heart sounds: Normal heart sounds. No murmur heard. Pulmonary:      Effort: Pulmonary effort is normal.      Breath sounds: Normal breath sounds. No wheezing or rales. Abdominal:      Palpations: Abdomen is soft. Tenderness: There is no abdominal tenderness. There is no guarding or rebound. Musculoskeletal:         General: Deformity (oa) present. No tenderness. Normal range of motion. Cervical back: Normal range of motion and neck supple. Right lower le+ Pitting Edema present. Left lower le+ Pitting Edema present. Lymphadenopathy:      Cervical: No cervical adenopathy. Skin:     General: Skin is warm and dry. Neurological:      Mental Status: She is alert and oriented to person, place, and time. Psychiatric:         Mood and Affect: Mood normal.         Behavior: Behavior normal.         Thought Content: Thought content normal.         Judgment: Judgment normal.         ASSESSMENT/PLAN  1. Essential hypertension  Continue current medication and refer to Dr. Vern Campo  - External Referral To Cardiology    2. Edema of both legs  Continue elevation of legs. Suggested trying to use a slip on device to get compression socks on but she really is not interested in wearing them. Keep legs elevated when possible. - External Referral To Cardiology    3. Heart valve disorder  We will try to get echo results from Dr. Matthew Kincaid office  - External Referral To Cardiology    4.  Multiple sclerosis ([address])  Seeing new provider  - Marianna Hayward Mai, Neurology,

## 2024-01-15 DIAGNOSIS — B00.9 HERPES: ICD-10-CM

## 2024-01-15 DIAGNOSIS — F51.01 PRIMARY INSOMNIA: ICD-10-CM

## 2024-01-15 DIAGNOSIS — G43.709 CHRONIC MIGRAINE WITHOUT AURA WITHOUT STATUS MIGRAINOSUS, NOT INTRACTABLE: ICD-10-CM

## 2024-01-15 RX ORDER — DOXEPIN HYDROCHLORIDE 25 MG/1
50 CAPSULE ORAL 2 TIMES DAILY
Qty: 120 CAPSULE | Refills: 3 | Status: SHIPPED | OUTPATIENT
Start: 2024-01-15

## 2024-01-15 RX ORDER — VALACYCLOVIR HYDROCHLORIDE 500 MG/1
500 TABLET, FILM COATED ORAL 2 TIMES DAILY
Qty: 30 TABLET | Refills: 4 | Status: SHIPPED | OUTPATIENT
Start: 2024-01-15

## 2024-01-15 NOTE — TELEPHONE ENCOUNTER
Disha Weeks is calling to request a refill on the following medication(s):    Last Visit Date (If Applicable):  11/6/2023    Next Visit Date:    Visit date not found    Medication Request:  Requested Prescriptions     Pending Prescriptions Disp Refills    valACYclovir (VALTREX) 500 MG tablet [Pharmacy Med Name: VALACYCLOVIR  MG TABLET] 30 tablet 4     Sig: TAKE 1 TABLET BY MOUTH TWICE A DAY

## 2024-01-15 NOTE — TELEPHONE ENCOUNTER
Disha Weeks is calling to request a refill on the following medication(s):    Last Visit Date (If Applicable):  11/6/2023    Next Visit Date:    Visit date not found    Medication Request:  Requested Prescriptions     Pending Prescriptions Disp Refills    valACYclovir (VALTREX) 500 MG tablet [Pharmacy Med Name: VALACYCLOVIR  MG TABLET] 30 tablet 4     Sig: TAKE 1 TABLET BY MOUTH TWICE A DAY    doxepin (SINEQUAN) 25 MG capsule 120 capsule 3     Sig: Take 2 capsules by mouth 2 times daily

## 2024-04-18 ENCOUNTER — TELEPHONE (OUTPATIENT)
Dept: FAMILY MEDICINE CLINIC | Age: 89
End: 2024-04-18

## 2024-04-18 NOTE — TELEPHONE ENCOUNTER
Acute respiratory issue     Patient complains of symptoms of a URI  Symptoms for how long started Monday, 3 1/2 days   What meds has pt tried none  Does patient have asthma No  Is patient on inhalers No  Other symptoms include non productive cough, sore throat, and scrathcy throat, whole body ache. Stated getting worse. Is requesting something to be sent to the pharmacy, has not taken a covid test.  Please advise.     Is this sinus, cold or cough related Yes      [id number] - [place] - [address] - P [phone] - F [phone] [address] [place] [address]  Phone: [phone]  Fax: [phone]         *This condition is eligible for an eVisit. If not already active, sign patient up for MyChart to improve access and communication with the provider.*     Conjuntivae and eyelids appear normal,  Sclerae : White without injection

## 2024-05-21 DIAGNOSIS — G43.709 CHRONIC MIGRAINE WITHOUT AURA WITHOUT STATUS MIGRAINOSUS, NOT INTRACTABLE: ICD-10-CM

## 2024-05-21 DIAGNOSIS — F51.01 PRIMARY INSOMNIA: ICD-10-CM

## 2024-05-21 RX ORDER — DOXEPIN HYDROCHLORIDE 25 MG/1
50 CAPSULE ORAL 2 TIMES DAILY
Qty: 120 CAPSULE | Refills: 0 | Status: SHIPPED | OUTPATIENT
Start: 2024-05-21

## 2024-05-21 NOTE — TELEPHONE ENCOUNTER
Please call patient to schedule an appointment.   Procedural care complete, waiting to speak with  to finalize discharge.

## 2024-06-16 DIAGNOSIS — F33.41 RECURRENT MAJOR DEPRESSIVE DISORDER, IN PARTIAL REMISSION (HCC): Primary | ICD-10-CM

## 2024-06-16 DIAGNOSIS — G43.709 CHRONIC MIGRAINE WITHOUT AURA WITHOUT STATUS MIGRAINOSUS, NOT INTRACTABLE: ICD-10-CM

## 2024-06-16 DIAGNOSIS — F51.01 PRIMARY INSOMNIA: ICD-10-CM

## 2024-06-17 RX ORDER — DOXEPIN HYDROCHLORIDE 25 MG/1
50 CAPSULE ORAL 2 TIMES DAILY
Qty: 360 CAPSULE | Refills: 1 | Status: SHIPPED | OUTPATIENT
Start: 2024-06-17

## 2024-07-01 ENCOUNTER — OFFICE VISIT (OUTPATIENT)
Dept: FAMILY MEDICINE CLINIC | Age: 89
End: 2024-07-01
Payer: MEDICARE

## 2024-07-01 VITALS
SYSTOLIC BLOOD PRESSURE: 110 MMHG | BODY MASS INDEX: 24.64 KG/M2 | HEART RATE: 58 BPM | WEIGHT: 122.2 LBS | DIASTOLIC BLOOD PRESSURE: 82 MMHG | HEIGHT: 59 IN

## 2024-07-01 DIAGNOSIS — G35 MULTIPLE SCLEROSIS (HCC): ICD-10-CM

## 2024-07-01 DIAGNOSIS — M79.89 MASS OF SOFT TISSUE OF NECK: ICD-10-CM

## 2024-07-01 DIAGNOSIS — R13.12 OROPHARYNGEAL DYSPHAGIA: ICD-10-CM

## 2024-07-01 DIAGNOSIS — Z00.00 MEDICARE ANNUAL WELLNESS VISIT, SUBSEQUENT: Primary | ICD-10-CM

## 2024-07-01 DIAGNOSIS — N18.32 STAGE 3B CHRONIC KIDNEY DISEASE (HCC): ICD-10-CM

## 2024-07-01 DIAGNOSIS — I89.0 LYMPHEDEMA OF BOTH LOWER EXTREMITIES: ICD-10-CM

## 2024-07-01 DIAGNOSIS — R53.1 WEAKNESS GENERALIZED: ICD-10-CM

## 2024-07-01 DIAGNOSIS — I10 ESSENTIAL HYPERTENSION: ICD-10-CM

## 2024-07-01 PROBLEM — F33.9 MAJOR DEPRESSIVE DISORDER, RECURRENT, UNSPECIFIED (HCC): Status: RESOLVED | Noted: 2019-02-13 | Resolved: 2024-07-01

## 2024-07-01 PROBLEM — S06.5XAA SUBDURAL HEMATOMA (HCC): Status: RESOLVED | Noted: 2019-02-10 | Resolved: 2024-07-01

## 2024-07-01 PROBLEM — F33.42 MAJOR DEPRESSIVE DISORDER, RECURRENT, IN FULL REMISSION (HCC): Status: RESOLVED | Noted: 2019-01-04 | Resolved: 2024-07-01

## 2024-07-01 PROCEDURE — 1123F ACP DISCUSS/DSCN MKR DOCD: CPT | Performed by: FAMILY MEDICINE

## 2024-07-01 PROCEDURE — 1090F PRES/ABSN URINE INCON ASSESS: CPT | Performed by: FAMILY MEDICINE

## 2024-07-01 PROCEDURE — G0439 PPPS, SUBSEQ VISIT: HCPCS | Performed by: FAMILY MEDICINE

## 2024-07-01 PROCEDURE — G8427 DOCREV CUR MEDS BY ELIG CLIN: HCPCS | Performed by: FAMILY MEDICINE

## 2024-07-01 PROCEDURE — G8420 CALC BMI NORM PARAMETERS: HCPCS | Performed by: FAMILY MEDICINE

## 2024-07-01 PROCEDURE — 99214 OFFICE O/P EST MOD 30 MIN: CPT | Performed by: FAMILY MEDICINE

## 2024-07-01 PROCEDURE — 1036F TOBACCO NON-USER: CPT | Performed by: FAMILY MEDICINE

## 2024-07-01 ASSESSMENT — PATIENT HEALTH QUESTIONNAIRE - PHQ9
SUM OF ALL RESPONSES TO PHQ QUESTIONS 1-9: 0
SUM OF ALL RESPONSES TO PHQ QUESTIONS 1-9: 0
10. IF YOU CHECKED OFF ANY PROBLEMS, HOW DIFFICULT HAVE THESE PROBLEMS MADE IT FOR YOU TO DO YOUR WORK, TAKE CARE OF THINGS AT HOME, OR GET ALONG WITH OTHER PEOPLE: NOT DIFFICULT AT ALL
SUM OF ALL RESPONSES TO PHQ QUESTIONS 1-9: 0
3. TROUBLE FALLING OR STAYING ASLEEP: NOT AT ALL
2. FEELING DOWN, DEPRESSED OR HOPELESS: NOT AT ALL
SUM OF ALL RESPONSES TO PHQ QUESTIONS 1-9: 0
7. TROUBLE CONCENTRATING ON THINGS, SUCH AS READING THE NEWSPAPER OR WATCHING TELEVISION: NOT AT ALL
8. MOVING OR SPEAKING SO SLOWLY THAT OTHER PEOPLE COULD HAVE NOTICED. OR THE OPPOSITE, BEING SO FIGETY OR RESTLESS THAT YOU HAVE BEEN MOVING AROUND A LOT MORE THAN USUAL: NOT AT ALL
5. POOR APPETITE OR OVEREATING: NOT AT ALL
1. LITTLE INTEREST OR PLEASURE IN DOING THINGS: NOT AT ALL
4. FEELING TIRED OR HAVING LITTLE ENERGY: NOT AT ALL
9. THOUGHTS THAT YOU WOULD BE BETTER OFF DEAD, OR OF HURTING YOURSELF: NOT AT ALL
SUM OF ALL RESPONSES TO PHQ9 QUESTIONS 1 & 2: 0
6. FEELING BAD ABOUT YOURSELF - OR THAT YOU ARE A FAILURE OR HAVE LET YOURSELF OR YOUR FAMILY DOWN: NOT AT ALL

## 2024-07-01 ASSESSMENT — ENCOUNTER SYMPTOMS
DIARRHEA: 0
SHORTNESS OF BREATH: 1
CONSTIPATION: 0
COUGH: 0
ABDOMINAL PAIN: 0
BLOOD IN STOOL: 0
ALLERGIC/IMMUNOLOGIC NEGATIVE: 1
EYES NEGATIVE: 1

## 2024-07-01 ASSESSMENT — LIFESTYLE VARIABLES
HOW OFTEN DO YOU HAVE A DRINK CONTAINING ALCOHOL: 2-4 TIMES A MONTH
HOW MANY STANDARD DRINKS CONTAINING ALCOHOL DO YOU HAVE ON A TYPICAL DAY: 1 OR 2

## 2024-07-01 NOTE — PATIENT INSTRUCTIONS
Learning About Being Active as an Older Adult  Why is being active important as you get older?     Being active is one of the best things you can do for your health. And it's never too late to start. Being active--or getting active, if you aren't already--has definite benefits. It can:  Give you more energy,  Keep your mind sharp.  Improve balance to reduce your risk of falls.  Help you manage chronic illness with fewer medicines.  No matter how old you are, how fit you are, or what health problems you have, there is a form of activity that will work for you. And the more physical activity you can do, the better your overall health will be.  What kinds of activity can help you stay healthy?  Being more active will make your daily activities easier. Physical activity includes planned exercise and things you do in daily life. There are four types of activity:  Aerobic.  Doing aerobic activity makes your heart and lungs strong.  Includes walking, dancing, and gardening.  Aim for at least 2½ hours spread throughout the week.  It improves your energy and can help you sleep better.  Muscle-strengthening.  This type of activity can help maintain muscle and strengthen bones.  Includes climbing stairs, using resistance bands, and lifting or carrying heavy loads.  Aim for at least twice a week.  It can help protect the knees and other joints.  Stretching.  Stretching gives you better range of motion in joints and muscles.  Includes upper arm stretches, calf stretches, and gentle yoga.  Aim for at least twice a week, preferably after your muscles are warmed up from other activities.  It can help you function better in daily life.  Balancing.  This helps you stay coordinated and have good posture.  Includes heel-to-toe walking, rolando chi, and certain types of yoga.  Aim for at least 3 days a week.  It can reduce your risk of falling.  Even if you have a hard time meeting the recommendations, it's better to be more active

## 2024-07-01 NOTE — PROGRESS NOTES
Medicare Annual Wellness Visit    Disha Weeks is here for Medicare AWV  AMW visit.  Here with her daughter.  Has other concerns including swelling of both ankles and feet. She has taken diuretic in the past which caused reduction in creatinine. Has been to cardiologist last week, has been gaining weight. He said it was not related to fluid retention or her heart. She is feeling short of breath with mild exertion. She would like to see another cardiologist for a second opinion, has appt with Dr. Briggs. Saw a vascular specialist who diagnosed lymphedema. She cannot tolerate compression socks. Has been able to use compression wraps in the past and still has them.   She started using a walker recently due to weakness of gait. Living in her own home, no longer driving.   Has trouble swallowing, choked on an apple once. Has had swallow study in the past which she says was normal.   Has a stress test scheduled in July per Dr. Nicole.   Hx of lumbar spine surgery, had pain last year that she thinks was from something slipping. Has been to [facility] which relieved the pain but now she has a limp.         Assessment & Plan   Medicare annual wellness visit, subsequent  Multiple sclerosis (HCC)  Assessment & Plan:   Borderline controlled, continue current medications and not seeing specialist any longer  Orders:  -     Marlyn Banks PA, Neurology, [facility], referral to specialist.  Discussed that many of her symptoms could be related to MS.  Stage 3b chronic kidney disease (HCC)  Assessment & Plan:   Well-controlled, continue current medications.  Resolved on last labs done in ED.  Avoid diuretics.  Mass of soft tissue of neck  -     US HEAD NECK SOFT TISSUE THYROID; Future  Essential hypertension-chronic and controlled on current medication.  Lymphedema of both lower extremities-encouraged to use lymphedema wraps that she has available at home.  Weakness generalized-continue to use walker to avoid falls.  See

## 2024-07-20 DIAGNOSIS — E55.9 VITAMIN D DEFICIENCY: ICD-10-CM

## 2024-07-22 RX ORDER — RESVER/WINE/BFL/GRPSD/PC/C/POM 200MG-60MG
1 CAPSULE ORAL DAILY
Qty: 90 TABLET | Refills: 3 | Status: SHIPPED | OUTPATIENT
Start: 2024-07-22

## 2024-07-22 NOTE — TELEPHONE ENCOUNTER
Disha Weeks is calling to request a refill on the following medication(s):    Last Visit Date (If Applicable):  7/1/2024    Next Visit Date:    7/7/2025    Medication Request:  Requested Prescriptions     Pending Prescriptions Disp Refills    D-5000 125 MCG (5000 UT) TABS tablet [Pharmacy Med Name: VITAMIN D3 5,000 UNIT TABLET] 90 tablet 3     Sig: TAKE 1 TABLET BY MOUTH EVERY DAY

## 2024-07-25 ENCOUNTER — TELEPHONE (OUTPATIENT)
Dept: FAMILY MEDICINE CLINIC | Age: 89
End: 2024-07-25

## 2024-07-25 DIAGNOSIS — I10 ESSENTIAL HYPERTENSION: ICD-10-CM

## 2024-07-25 DIAGNOSIS — R53.1 WEAKNESS GENERALIZED: ICD-10-CM

## 2024-07-25 DIAGNOSIS — I89.0 LYMPHEDEMA OF BOTH LOWER EXTREMITIES: ICD-10-CM

## 2024-07-25 DIAGNOSIS — N18.32 STAGE 3B CHRONIC KIDNEY DISEASE (HCC): ICD-10-CM

## 2024-07-25 DIAGNOSIS — E55.9 VITAMIN D DEFICIENCY: Primary | ICD-10-CM

## 2024-07-25 DIAGNOSIS — I89.0 LYMPHEDEMA OF BOTH LOWER EXTREMITIES: Primary | ICD-10-CM

## 2024-07-25 DIAGNOSIS — R53.82 CHRONIC FATIGUE: ICD-10-CM

## 2024-07-25 NOTE — TELEPHONE ENCOUNTER
Patient called in stating that she was dx with lymphedema and her cardiologist suggested that she reaches out to you to order some blood work she mentioned she is swollen all over      Please advise

## 2024-07-29 ENCOUNTER — HOSPITAL ENCOUNTER (OUTPATIENT)
Age: 89
Discharge: HOME | End: 2024-07-29
Payer: MEDICARE

## 2024-07-29 ENCOUNTER — HOSPITAL ENCOUNTER (OUTPATIENT)
Dept: ULTRASOUND IMAGING | Age: 89
Discharge: HOME | End: 2024-07-31
Payer: MEDICARE

## 2024-07-29 DIAGNOSIS — R53.1 WEAKNESS GENERALIZED: ICD-10-CM

## 2024-07-29 DIAGNOSIS — R53.82 CHRONIC FATIGUE: ICD-10-CM

## 2024-07-29 DIAGNOSIS — E55.9 VITAMIN D DEFICIENCY: ICD-10-CM

## 2024-07-29 DIAGNOSIS — I10 ESSENTIAL HYPERTENSION: ICD-10-CM

## 2024-07-29 DIAGNOSIS — I89.0 LYMPHEDEMA OF BOTH LOWER EXTREMITIES: ICD-10-CM

## 2024-07-29 DIAGNOSIS — M79.89 MASS OF SOFT TISSUE OF NECK: ICD-10-CM

## 2024-07-29 DIAGNOSIS — N18.32 STAGE 3B CHRONIC KIDNEY DISEASE (HCC): ICD-10-CM

## 2024-07-29 LAB
25(OH)D3 SERPL-MCNC: 35 NG/ML (ref 30–100)
ALBUMIN SERPL-MCNC: 4.1 G/DL (ref 3.5–5.2)
ALBUMIN/GLOB SERPL: 2 {RATIO} (ref 1–2.5)
ALP SERPL-CCNC: 113 U/L (ref 35–104)
ALT SERPL-CCNC: 10 U/L (ref 10–35)
ANION GAP SERPL CALCULATED.3IONS-SCNC: 11 MMOL/L (ref 9–16)
AST SERPL-CCNC: 21 U/L (ref 10–35)
BASOPHILS # BLD: 0.07 K/UL (ref 0–0.2)
BASOPHILS NFR BLD: 1 % (ref 0–2)
BILIRUB SERPL-MCNC: <0.2 MG/DL (ref 0–1.2)
BUN SERPL-MCNC: 24 MG/DL (ref 8–23)
CALCIUM SERPL-MCNC: 9.9 MG/DL (ref 8.6–10.4)
CHLORIDE SERPL-SCNC: 105 MMOL/L (ref 98–107)
CO2 SERPL-SCNC: 21 MMOL/L (ref 20–31)
CREAT SERPL-MCNC: 1 MG/DL (ref 0.5–0.9)
EOSINOPHIL # BLD: 0.15 K/UL (ref 0–0.44)
EOSINOPHILS RELATIVE PERCENT: 2 % (ref 1–4)
ERYTHROCYTE [DISTWIDTH] IN BLOOD BY AUTOMATED COUNT: 16.9 % (ref 11.8–14.4)
GFR, ESTIMATED: 53 ML/MIN/1.73M2
GLUCOSE SERPL-MCNC: 135 MG/DL (ref 74–99)
HCT VFR BLD AUTO: 32.9 % (ref 36.3–47.1)
HGB BLD-MCNC: 9.7 G/DL (ref 11.9–15.1)
IMM GRANULOCYTES # BLD AUTO: <0.03 K/UL (ref 0–0.3)
IMM GRANULOCYTES NFR BLD: 0 %
LYMPHOCYTES NFR BLD: 1.7 K/UL (ref 1.1–3.7)
LYMPHOCYTES RELATIVE PERCENT: 26 % (ref 24–43)
MAGNESIUM SERPL-MCNC: 2.1 MG/DL (ref 1.6–2.4)
MCH RBC QN AUTO: 23.2 PG (ref 25.2–33.5)
MCHC RBC AUTO-ENTMCNC: 29.5 G/DL (ref 28.4–34.8)
MCV RBC AUTO: 78.7 FL (ref 82.6–102.9)
MONOCYTES NFR BLD: 0.5 K/UL (ref 0.1–1.2)
MONOCYTES NFR BLD: 8 % (ref 3–12)
NEUTROPHILS NFR BLD: 63 % (ref 36–65)
NEUTS SEG NFR BLD: 4.18 K/UL (ref 1.5–8.1)
NRBC BLD-RTO: 0 PER 100 WBC
PLATELET # BLD AUTO: 329 K/UL (ref 138–453)
PMV BLD AUTO: 9.8 FL (ref 8.1–13.5)
POTASSIUM SERPL-SCNC: 4.4 MMOL/L (ref 3.7–5.3)
PROT SERPL-MCNC: 6.4 G/DL (ref 6.6–8.7)
RBC # BLD AUTO: 4.18 M/UL (ref 3.95–5.11)
RBC # BLD: ABNORMAL 10*6/UL
SODIUM SERPL-SCNC: 137 MMOL/L (ref 136–145)
T4 FREE SERPL-MCNC: 1.1 NG/DL (ref 0.92–1.68)
TSH SERPL DL<=0.05 MIU/L-ACNC: 1.9 UIU/ML (ref 0.27–4.2)
VIT B12 SERPL-MCNC: 640 PG/ML (ref 232–1245)
WBC OTHER # BLD: 6.6 K/UL (ref 3.5–11.3)

## 2024-07-29 PROCEDURE — 85025 COMPLETE CBC W/AUTO DIFF WBC: CPT

## 2024-07-29 PROCEDURE — 84439 ASSAY OF FREE THYROXINE: CPT

## 2024-07-29 PROCEDURE — 84443 ASSAY THYROID STIM HORMONE: CPT

## 2024-07-29 PROCEDURE — 82607 VITAMIN B-12: CPT

## 2024-07-29 PROCEDURE — 80053 COMPREHEN METABOLIC PANEL: CPT

## 2024-07-29 PROCEDURE — 76536 US EXAM OF HEAD AND NECK: CPT

## 2024-07-29 PROCEDURE — 82306 VITAMIN D 25 HYDROXY: CPT

## 2024-07-29 PROCEDURE — 36415 COLL VENOUS BLD VENIPUNCTURE: CPT

## 2024-07-29 PROCEDURE — 83735 ASSAY OF MAGNESIUM: CPT

## 2024-08-05 DIAGNOSIS — D50.8 IRON DEFICIENCY ANEMIA SECONDARY TO INADEQUATE DIETARY IRON INTAKE: Primary | ICD-10-CM

## 2024-08-21 DIAGNOSIS — I10 ESSENTIAL HYPERTENSION: ICD-10-CM

## 2024-08-21 RX ORDER — ATENOLOL 25 MG/1
25 TABLET ORAL DAILY
Qty: 30 TABLET | Refills: 5 | Status: SHIPPED | OUTPATIENT
Start: 2024-08-21

## 2024-08-21 NOTE — TELEPHONE ENCOUNTER
Disha Weeks is calling to request a refill on the following medication(s):    Last Visit Date (If Applicable):  7/1/2024    Next Visit Date:    7/7/2025    Medication Request:  Requested Prescriptions     Pending Prescriptions Disp Refills    atenolol (TENORMIN) 25 MG tablet [Pharmacy Med Name: ATENOLOL 25 MG TABLET] 30 tablet 5     Sig: TAKE 1 TABLET BY MOUTH EVERY DAY

## 2024-11-02 DIAGNOSIS — B00.9 HERPES: ICD-10-CM

## 2024-11-04 RX ORDER — VALACYCLOVIR HYDROCHLORIDE 500 MG/1
500 TABLET, FILM COATED ORAL 2 TIMES DAILY
Qty: 180 TABLET | Refills: 1 | Status: SHIPPED | OUTPATIENT
Start: 2024-11-04

## 2024-11-04 NOTE — TELEPHONE ENCOUNTER
Disha Weeks is calling to request a refill on the following medication(s):    Last Visit Date (If Applicable):  7/1/2024    Next Visit Date:    7/7/2025    Medication Request:  Requested Prescriptions     Pending Prescriptions Disp Refills    valACYclovir (VALTREX) 500 MG tablet [Pharmacy Med Name: VALACYCLOVIR  MG TABLET] 180 tablet 1     Sig: TAKE 1 TABLET BY MOUTH TWICE A DAY

## 2025-02-28 DIAGNOSIS — F33.41 RECURRENT MAJOR DEPRESSIVE DISORDER, IN PARTIAL REMISSION: ICD-10-CM

## 2025-02-28 DIAGNOSIS — G43.709 CHRONIC MIGRAINE WITHOUT AURA WITHOUT STATUS MIGRAINOSUS, NOT INTRACTABLE: ICD-10-CM

## 2025-02-28 DIAGNOSIS — F51.01 PRIMARY INSOMNIA: ICD-10-CM

## 2025-02-28 RX ORDER — DOXEPIN HYDROCHLORIDE 25 MG/1
50 CAPSULE ORAL 2 TIMES DAILY
Qty: 120 CAPSULE | Refills: 5 | Status: SHIPPED | OUTPATIENT
Start: 2025-02-28

## 2025-06-06 DIAGNOSIS — B00.9 HERPES: ICD-10-CM

## 2025-06-06 RX ORDER — VALACYCLOVIR HYDROCHLORIDE 500 MG/1
500 TABLET, FILM COATED ORAL 2 TIMES DAILY
Qty: 60 TABLET | Refills: 5 | Status: SHIPPED | OUTPATIENT
Start: 2025-06-06

## 2025-07-03 ENCOUNTER — OFFICE VISIT (OUTPATIENT)
Dept: FAMILY MEDICINE CLINIC | Age: 89
End: 2025-07-03

## 2025-07-03 VITALS
DIASTOLIC BLOOD PRESSURE: 68 MMHG | SYSTOLIC BLOOD PRESSURE: 110 MMHG | HEIGHT: 59 IN | WEIGHT: 126.8 LBS | BODY MASS INDEX: 25.56 KG/M2 | HEART RATE: 86 BPM

## 2025-07-03 DIAGNOSIS — R60.0 EDEMA OF BOTH LEGS: ICD-10-CM

## 2025-07-03 DIAGNOSIS — G35 MULTIPLE SCLEROSIS (HCC): ICD-10-CM

## 2025-07-03 DIAGNOSIS — I10 ESSENTIAL HYPERTENSION: ICD-10-CM

## 2025-07-03 DIAGNOSIS — Z00.00 MEDICARE ANNUAL WELLNESS VISIT, SUBSEQUENT: Primary | ICD-10-CM

## 2025-07-03 DIAGNOSIS — K62.89 PROCTITIS: ICD-10-CM

## 2025-07-03 DIAGNOSIS — K59.04 CHRONIC IDIOPATHIC CONSTIPATION: ICD-10-CM

## 2025-07-03 RX ORDER — FUROSEMIDE 20 MG/1
20 TABLET ORAL DAILY
COMMUNITY

## 2025-07-03 RX ORDER — MESALAMINE 1000 MG/1
1000 SUPPOSITORY RECTAL DAILY
Qty: 30 SUPPOSITORY | Refills: 0 | Status: SHIPPED | OUTPATIENT
Start: 2025-07-03

## 2025-07-03 RX ORDER — METOLAZONE 2.5 MG/1
TABLET ORAL
COMMUNITY
Start: 2025-06-30

## 2025-07-03 SDOH — ECONOMIC STABILITY: FOOD INSECURITY: WITHIN THE PAST 12 MONTHS, THE FOOD YOU BOUGHT JUST DIDN'T LAST AND YOU DIDN'T HAVE MONEY TO GET MORE.: NEVER TRUE

## 2025-07-03 SDOH — ECONOMIC STABILITY: FOOD INSECURITY: WITHIN THE PAST 12 MONTHS, YOU WORRIED THAT YOUR FOOD WOULD RUN OUT BEFORE YOU GOT MONEY TO BUY MORE.: NEVER TRUE

## 2025-07-03 ASSESSMENT — LIFESTYLE VARIABLES
HOW MANY STANDARD DRINKS CONTAINING ALCOHOL DO YOU HAVE ON A TYPICAL DAY: 1 OR 2
HOW OFTEN DO YOU HAVE A DRINK CONTAINING ALCOHOL: MONTHLY OR LESS

## 2025-07-03 ASSESSMENT — PATIENT HEALTH QUESTIONNAIRE - PHQ9
2. FEELING DOWN, DEPRESSED OR HOPELESS: NOT AT ALL
3. TROUBLE FALLING OR STAYING ASLEEP: NOT AT ALL
8. MOVING OR SPEAKING SO SLOWLY THAT OTHER PEOPLE COULD HAVE NOTICED. OR THE OPPOSITE, BEING SO FIGETY OR RESTLESS THAT YOU HAVE BEEN MOVING AROUND A LOT MORE THAN USUAL: NOT AT ALL
1. LITTLE INTEREST OR PLEASURE IN DOING THINGS: NOT AT ALL
SUM OF ALL RESPONSES TO PHQ QUESTIONS 1-9: 0
7. TROUBLE CONCENTRATING ON THINGS, SUCH AS READING THE NEWSPAPER OR WATCHING TELEVISION: NOT AT ALL
4. FEELING TIRED OR HAVING LITTLE ENERGY: NOT AT ALL
SUM OF ALL RESPONSES TO PHQ QUESTIONS 1-9: 0
6. FEELING BAD ABOUT YOURSELF - OR THAT YOU ARE A FAILURE OR HAVE LET YOURSELF OR YOUR FAMILY DOWN: NOT AT ALL
SUM OF ALL RESPONSES TO PHQ QUESTIONS 1-9: 0
10. IF YOU CHECKED OFF ANY PROBLEMS, HOW DIFFICULT HAVE THESE PROBLEMS MADE IT FOR YOU TO DO YOUR WORK, TAKE CARE OF THINGS AT HOME, OR GET ALONG WITH OTHER PEOPLE: NOT DIFFICULT AT ALL
SUM OF ALL RESPONSES TO PHQ QUESTIONS 1-9: 0
9. THOUGHTS THAT YOU WOULD BE BETTER OFF DEAD, OR OF HURTING YOURSELF: NOT AT ALL
5. POOR APPETITE OR OVEREATING: NOT AT ALL

## 2025-07-03 NOTE — PROGRESS NOTES
Medicare Annual Wellness Visit    Disha Weeks is here for Medicare AWV    Assessment & Plan   Medicare annual wellness visit, subsequent  Multiple sclerosis (HCC)  Assessment & Plan:   Monitored by specialist- no acute findings meriting change in the plan  Essential hypertension  Edema of both legs  Proctitis  -     mesalamine (CANASA) 1000 MG suppository; Place 1 suppository rectally daily, Disp-30 suppository, R-0Normal  Chronic idiopathic constipation       Return in about 6 months (around 1/3/2026) for htn.     Subjective       Patient's complete Health Risk Assessment and screening values have been reviewed and are found in Flowsheets. The following problems were reviewed today and where indicated follow up appointments were made and/or referrals ordered.    Positive Risk Factor Screenings with Interventions:              Inactivity:  On average, how many days per week do you engage in moderate to strenuous exercise (like a brisk walk)?: 0 days (!) Abnormal  On average, how many minutes do you engage in exercise at this level?: 0 min  Interventions:  Patient comments: limited by weakness    Poor Eating Habits/Diet:  Do you eat balanced/healthy meals regularly?: (!) No  Interventions:  Patient comments: family brings meals         ADL's:   Patient reports needing help with:  Select all that apply: (!) Shopping, Banking/Finances, Transportation  Interventions:  Patient comments: family assists with shopping, finanaces, driving    Advanced Directives:  Do you have a Living Will?: (!) No    Intervention:  has NO advanced directive - information provided, daughter has health care power of                      Objective   Vitals:    07/03/25 1458   BP: 110/68   Pulse: 86   Weight: 57.5 kg (126 lb 12.8 oz)   Height: 1.499 m (4' 11\")      Body mass index is 25.61 kg/m².                  Allergies   Allergen Reactions    Codeine     Levaquin [Levofloxacin In D5w]     Morphine     Novocain [Procaine]

## 2025-08-25 DIAGNOSIS — I10 ESSENTIAL HYPERTENSION: ICD-10-CM

## 2025-08-25 RX ORDER — ATENOLOL 25 MG/1
25 TABLET ORAL DAILY
Qty: 30 TABLET | Refills: 5 | Status: SHIPPED | OUTPATIENT
Start: 2025-08-25